# Patient Record
Sex: MALE | Race: BLACK OR AFRICAN AMERICAN | Employment: FULL TIME | URBAN - METROPOLITAN AREA
[De-identification: names, ages, dates, MRNs, and addresses within clinical notes are randomized per-mention and may not be internally consistent; named-entity substitution may affect disease eponyms.]

---

## 2020-03-16 ENCOUNTER — HOSPITAL ENCOUNTER (EMERGENCY)
Age: 35
Discharge: HOME | End: 2020-03-16
Attending: EMERGENCY MEDICINE
Payer: MEDICARE

## 2020-03-16 VITALS
HEART RATE: 71 BPM | DIASTOLIC BLOOD PRESSURE: 106 MMHG | OXYGEN SATURATION: 99 % | RESPIRATION RATE: 16 BRPM | WEIGHT: 150 LBS | BODY MASS INDEX: 21 KG/M2 | TEMPERATURE: 98.4 F | HEIGHT: 71 IN | SYSTOLIC BLOOD PRESSURE: 145 MMHG

## 2020-03-16 LAB
ALBUMIN SERPL-MCNC: 4.1 GM/DL (ref 3.4–5)
ALP BLD-CCNC: 83 IU/L (ref 40–129)
ALT SERPL-CCNC: 19 U/L (ref 10–40)
ANION GAP SERPL CALCULATED.3IONS-SCNC: 13 MMOL/L (ref 4–16)
AST SERPL-CCNC: 26 IU/L (ref 15–37)
BASOPHILS ABSOLUTE: 0.1 K/CU MM
BASOPHILS RELATIVE PERCENT: 0.6 % (ref 0–1)
BILIRUB SERPL-MCNC: 0.4 MG/DL (ref 0–1)
BUN BLDV-MCNC: 8 MG/DL (ref 6–23)
CALCIUM SERPL-MCNC: 9.4 MG/DL (ref 8.3–10.6)
CHLORIDE BLD-SCNC: 99 MMOL/L (ref 99–110)
CO2: 27 MMOL/L (ref 21–32)
CREAT SERPL-MCNC: 1.2 MG/DL (ref 0.9–1.3)
DIFFERENTIAL TYPE: ABNORMAL
EOSINOPHILS ABSOLUTE: 0.1 K/CU MM
EOSINOPHILS RELATIVE PERCENT: 0.7 % (ref 0–3)
GFR AFRICAN AMERICAN: >60 ML/MIN/1.73M2
GFR NON-AFRICAN AMERICAN: >60 ML/MIN/1.73M2
GLUCOSE BLD-MCNC: 126 MG/DL (ref 70–99)
HCT VFR BLD CALC: 51.1 % (ref 42–52)
HEMOGLOBIN: 16.8 GM/DL (ref 13.5–18)
IMMATURE NEUTROPHIL %: 0.6 % (ref 0–0.43)
LIPASE: 28 IU/L (ref 13–60)
LYMPHOCYTES ABSOLUTE: 2.5 K/CU MM
LYMPHOCYTES RELATIVE PERCENT: 28.2 % (ref 24–44)
MAGNESIUM: 2 MG/DL (ref 1.8–2.4)
MCH RBC QN AUTO: 33.1 PG (ref 27–31)
MCHC RBC AUTO-ENTMCNC: 32.9 % (ref 32–36)
MCV RBC AUTO: 100.8 FL (ref 78–100)
MONOCYTES ABSOLUTE: 1.2 K/CU MM
MONOCYTES RELATIVE PERCENT: 13 % (ref 0–4)
NUCLEATED RBC %: 0 %
PDW BLD-RTO: 12.4 % (ref 11.7–14.9)
PLATELET # BLD: 374 K/CU MM (ref 140–440)
PMV BLD AUTO: 9.2 FL (ref 7.5–11.1)
POTASSIUM SERPL-SCNC: 3.5 MMOL/L (ref 3.5–5.1)
RBC # BLD: 5.07 M/CU MM (ref 4.6–6.2)
SEGMENTED NEUTROPHILS ABSOLUTE COUNT: 5.1 K/CU MM
SEGMENTED NEUTROPHILS RELATIVE PERCENT: 56.9 % (ref 36–66)
SODIUM BLD-SCNC: 139 MMOL/L (ref 135–145)
TOTAL IMMATURE NEUTOROPHIL: 0.05 K/CU MM
TOTAL NUCLEATED RBC: 0 K/CU MM
TOTAL PROTEIN: 7.5 GM/DL (ref 6.4–8.2)
WBC # BLD: 8.9 K/CU MM (ref 4–10.5)

## 2020-03-16 PROCEDURE — 6360000002 HC RX W HCPCS: Performed by: EMERGENCY MEDICINE

## 2020-03-16 PROCEDURE — 6370000000 HC RX 637 (ALT 250 FOR IP): Performed by: EMERGENCY MEDICINE

## 2020-03-16 PROCEDURE — 85025 COMPLETE CBC W/AUTO DIFF WBC: CPT

## 2020-03-16 PROCEDURE — 83735 ASSAY OF MAGNESIUM: CPT

## 2020-03-16 PROCEDURE — 80053 COMPREHEN METABOLIC PANEL: CPT

## 2020-03-16 PROCEDURE — 36415 COLL VENOUS BLD VENIPUNCTURE: CPT

## 2020-03-16 PROCEDURE — 99284 EMERGENCY DEPT VISIT MOD MDM: CPT

## 2020-03-16 PROCEDURE — 96375 TX/PRO/DX INJ NEW DRUG ADDON: CPT

## 2020-03-16 PROCEDURE — 96374 THER/PROPH/DIAG INJ IV PUSH: CPT

## 2020-03-16 PROCEDURE — 2500000003 HC RX 250 WO HCPCS: Performed by: EMERGENCY MEDICINE

## 2020-03-16 PROCEDURE — 2580000003 HC RX 258: Performed by: EMERGENCY MEDICINE

## 2020-03-16 PROCEDURE — 83690 ASSAY OF LIPASE: CPT

## 2020-03-16 RX ORDER — 0.9 % SODIUM CHLORIDE 0.9 %
1000 INTRAVENOUS SOLUTION INTRAVENOUS ONCE
Status: COMPLETED | OUTPATIENT
Start: 2020-03-16 | End: 2020-03-16

## 2020-03-16 RX ORDER — ONDANSETRON 2 MG/ML
4 INJECTION INTRAMUSCULAR; INTRAVENOUS ONCE
Status: COMPLETED | OUTPATIENT
Start: 2020-03-16 | End: 2020-03-16

## 2020-03-16 RX ORDER — MAGNESIUM HYDROXIDE/ALUMINUM HYDROXICE/SIMETHICONE 120; 1200; 1200 MG/30ML; MG/30ML; MG/30ML
30 SUSPENSION ORAL ONCE
Status: COMPLETED | OUTPATIENT
Start: 2020-03-16 | End: 2020-03-16

## 2020-03-16 RX ORDER — FAMOTIDINE 20 MG/1
20 TABLET, FILM COATED ORAL 2 TIMES DAILY
Qty: 20 TABLET | Refills: 0 | Status: SHIPPED | OUTPATIENT
Start: 2020-03-16 | End: 2020-03-26

## 2020-03-16 RX ADMIN — SODIUM CHLORIDE 1000 ML: 9 INJECTION, SOLUTION INTRAVENOUS at 09:54

## 2020-03-16 RX ADMIN — ONDANSETRON 4 MG: 2 INJECTION INTRAMUSCULAR; INTRAVENOUS at 09:54

## 2020-03-16 RX ADMIN — ALUMINUM HYDROXIDE, MAGNESIUM HYDROXIDE, AND SIMETHICONE 30 ML: 200; 200; 20 SUSPENSION ORAL at 11:01

## 2020-03-16 RX ADMIN — FAMOTIDINE 20 MG: 10 INJECTION, SOLUTION INTRAVENOUS at 11:02

## 2020-03-16 ASSESSMENT — PAIN DESCRIPTION - LOCATION: LOCATION: ABDOMEN

## 2020-03-16 ASSESSMENT — PAIN DESCRIPTION - PAIN TYPE: TYPE: ACUTE PAIN

## 2020-03-16 ASSESSMENT — PAIN DESCRIPTION - ORIENTATION: ORIENTATION: MID;UPPER

## 2020-03-16 ASSESSMENT — PAIN DESCRIPTION - DESCRIPTORS: DESCRIPTORS: DISCOMFORT

## 2020-03-16 ASSESSMENT — PAIN SCALES - GENERAL: PAINLEVEL_OUTOF10: 0

## 2020-03-16 NOTE — ED PROVIDER NOTES
[address] [facility]  eMERGENCY dEPARTMENT eNCOUnter        Pt Name: Denise Rodarte  MRN: [id number]  [place] 1985  Date of evaluation: 3/16/2020  Provider: Gary Moeller DO  PCP: No primary care provider on file. CHIEF COMPLAINT       Chief Complaint   Patient presents with    Abdominal Pain     mid abd pain/ epigastric pain since friday evening    Emesis       HISTORY OFPRESENT ILLNESS   (Location/Symptom, Timing/Onset, Context/Setting, Quality, Duration, Modifying Factors,Severity)  Note limiting factors. Denise Rodarte is a 37 y.o. male does have a vast medical history presents to the ED with about 3 days of epigastric abdominal pain with intermittent episodes of vomiting. Patient also stated that he feels he is got some constipation as well. Patient states his symptoms are worse with smoking marijuana better with drinking water. Denies fever chills chest pain dyspnea no other pain or complaints. Nursing Notes were all reviewed and agreed with or any disagreements were addressed  in the HPI. REVIEW OF SYSTEMS    (2-9 systems for level 4, 10 or more for level 5)     Review of Systems    Positives and Pertinent negatives as per HPI. Except as noted above in the ROS, all other systems were reviewed andnegative. PASTMEDICAL HISTORY     Past Medical History:   Diagnosis Date    Headache     Mitral valve prolapse          SURGICAL HISTORY     History reviewed. No pertinent surgical history. CURRENT MEDICATIONS       Previous Medications    BUTALBITAL-ASPIRIN-CAFFEINE (FIORINAL) -40 MG CAPSULE    Take 1 capsule by mouth every 6 hours as needed for Headaches . LIDOCAINE (LIDODERM) 5 %    Place 1 patch onto the skin daily 12 hours on, 12 hours off. NAPROXEN (NAPROSYN) 500 MG TABLET    Take 1 tablet by mouth 2 times daily for 10 days       ALLERGIES     Patient has no known allergies.     FAMILY HISTORY Procedures    CRITICAL CARE TIME   N/A    CONSULTS:  None    EMERGENCY DEPARTMENT COURSE and DIFFERENTIAL DIAGNOSIS/MDM:   Vitals:    Vitals:    03/16/20 0833 03/16/20 0852 03/16/20 0932 03/16/20 1057   BP: (!) 157/116 (!) 139/108 (!) 140/104    Pulse: 101   71   Resp: 16      Temp: 98.4 °F (36.9 °C)      TempSrc: Oral      SpO2: 93% 96% 99%    Weight: 150 lb (68 kg)      Height: 5' 11\" (1.803 m)          Patient was given the following medications:  Medications   0.9 % sodium chloride bolus (0 mLs Intravenous Stopped 3/16/20 1058)   ondansetron (ZOFRAN) injection 4 mg (4 mg Intravenous Given 3/16/20 0954)   aluminum & magnesium hydroxide-simethicone (MAALOX) 200-200-20 MG/5ML suspension 30 mL (30 mLs Oral Given 3/16/20 1101)   famotidine (PEPCID) injection 20 mg (20 mg Intravenous Given 3/16/20 1102)       Patient presents the ED with intermittent abdominal pain, epigastric, for the several days. Patient some episodes of vomiting. Patient states symptoms are worse with smoking marijuana. In ED patient is nontoxic no acute distress abdomen soft nontender will get CBC BMP check for leukocytosis lectured abnormality of the lipase any biliary dysfunction will treat symptoms and reassess. Work-up unremarkable patient okay to DC PCP follow return to ED with any new or worsening symptoms    The patient tolerated their visit well. Thepatient and / or the family were informed of the results of any tests, a time was given to answer questions. FINAL IMPRESSION      1. Abdominal pain, epigastric    2.  Non-intractable vomiting with nausea, unspecified vomiting type        DISPOSITION/PLAN   DISPOSITION        PATIENT REFERRED TO:  Ariel Bingham DO  [address] [facility]  [place] [address] [place] [address]  [phone]    Call in 1 day        DISCHARGE MEDICATIONS:  New Prescriptions    FAMOTIDINE (PEPCID) 20 MG TABLET    Take 1 tablet by mouth 2 times daily for 10 days       DISCONTINUED MEDICATIONS:  Discontinued Medications    No medications on file              (Please note that portions of this note were completed with a voice recognition program.  Efforts were made to edit the dictations but occasionally words aremis-transcribed.)    Liana Reaves DO (electronically signed)            Liana Reaves DO  03/16/20 110

## 2020-04-12 ENCOUNTER — HOSPITAL ENCOUNTER (EMERGENCY)
Age: 35
Discharge: HOME | End: 2020-04-12
Attending: EMERGENCY MEDICINE
Payer: MEDICARE

## 2020-04-12 VITALS
RESPIRATION RATE: 16 BRPM | WEIGHT: 150 LBS | HEART RATE: 87 BPM | DIASTOLIC BLOOD PRESSURE: 94 MMHG | HEIGHT: 71 IN | SYSTOLIC BLOOD PRESSURE: 148 MMHG | OXYGEN SATURATION: 97 % | BODY MASS INDEX: 21 KG/M2 | TEMPERATURE: 98.4 F

## 2020-04-12 PROCEDURE — 99283 EMERGENCY DEPT VISIT LOW MDM: CPT

## 2020-04-12 RX ORDER — POLYMYXIN B SULFATE AND TRIMETHOPRIM 1; 10000 MG/ML; [USP'U]/ML
1 SOLUTION OPHTHALMIC EVERY 4 HOURS
Qty: 1 BOTTLE | Refills: 0 | Status: SHIPPED | OUTPATIENT
Start: 2020-04-12 | End: 2020-04-19

## 2020-04-12 ASSESSMENT — ENCOUNTER SYMPTOMS
WHEEZING: 0
SINUS PRESSURE: 0
ABDOMINAL PAIN: 0
NAUSEA: 0
VOMITING: 0
RHINORRHEA: 1
SORE THROAT: 0
CONSTIPATION: 0
SHORTNESS OF BREATH: 0
COUGH: 0
DIARRHEA: 0

## 2020-04-12 NOTE — ED PROVIDER NOTES
Financial resource strain: Not on file    Food insecurity     Worry: Not on file     Inability: Not on file   [employer] needs     Medical: Not on file     Non-medical: Not on file   Tobacco Use    Smoking status: Current Every Day Smoker     Packs/day: 0.50     Types: Cigarettes    Smokeless tobacco: Never Used   Substance and Sexual Activity    Alcohol use: Yes     Alcohol/week: 10.0 standard drinks     Types: 10 Shots of liquor per week     Comment: daily    Drug use: Yes     Types: Marijuana    Sexual activity: Yes     Partners: Female   Lifestyle    Physical activity     Days per week: Not on file     Minutes per session: Not on file    Stress: Not on file   Relationships    Social connections     Talks on phone: Not on file     Gets together: Not on file     Attends Congregation service: Not on file     Active member of club or organization: Not on file     Attends meetings of clubs or organizations: Not on file     Relationship status: Not on file    Intimate partner violence     Fear of current or ex partner: Not on file     Emotionally abused: Not on file     Physically abused: Not on file     Forced sexual activity: Not on file   Other Topics Concern    Not on file   Social History Narrative    Not on file     No current facility-administered medications for this encounter. Current Outpatient Medications   Medication Sig Dispense Refill    trimethoprim-polymyxin b (POLYTRIM) 01788-7.1 UNIT/ML-% ophthalmic solution Place 1 drop into both eyes every 4 hours for 7 days 1 Bottle 0    famotidine (PEPCID) 20 MG tablet Take 1 tablet by mouth 2 times daily for 10 days 20 tablet 0    butalbital-aspirin-caffeine (FIORINAL) -40 MG capsule Take 1 capsule by mouth every 6 hours as needed for Headaches .  20 capsule 0    naproxen (NAPROSYN) 500 MG tablet Take 1 tablet by mouth 2 times daily for 10 days 20 tablet 0    lidocaine (LIDODERM) 5 % Place 1 patch onto the skin daily 12 hours on, 12

## 2020-04-12 NOTE — ED TRIAGE NOTES
Pt to ED today for c/o bilateral eye redness and drainage, pt reports he wakes up in the mornings with his bilateral eyes matted shut.

## 2020-04-19 ENCOUNTER — HOSPITAL ENCOUNTER (EMERGENCY)
Age: 35
Discharge: HOME | End: 2020-04-19
Payer: MEDICARE

## 2020-04-19 VITALS
HEART RATE: 88 BPM | RESPIRATION RATE: 16 BRPM | DIASTOLIC BLOOD PRESSURE: 103 MMHG | WEIGHT: 150 LBS | TEMPERATURE: 98.6 F | BODY MASS INDEX: 21 KG/M2 | HEIGHT: 71 IN | SYSTOLIC BLOOD PRESSURE: 142 MMHG | OXYGEN SATURATION: 98 %

## 2020-04-19 PROCEDURE — 99282 EMERGENCY DEPT VISIT SF MDM: CPT

## 2020-04-19 PROCEDURE — 6370000000 HC RX 637 (ALT 250 FOR IP): Performed by: PHYSICIAN ASSISTANT

## 2020-04-19 RX ORDER — MOXIFLOXACIN 5 MG/ML
1 SOLUTION/ DROPS OPHTHALMIC 3 TIMES DAILY
Qty: 1 BOTTLE | Refills: 0 | Status: SHIPPED | OUTPATIENT
Start: 2020-04-19 | End: 2020-04-26

## 2020-04-19 RX ORDER — TETRACAINE HYDROCHLORIDE 5 MG/ML
2 SOLUTION OPHTHALMIC ONCE
Status: COMPLETED | OUTPATIENT
Start: 2020-04-19 | End: 2020-04-19

## 2020-04-19 RX ADMIN — TETRACAINE HYDROCHLORIDE 2 DROP: 5 SOLUTION OPHTHALMIC at 12:54

## 2020-04-19 RX ADMIN — FLUORESCEIN SODIUM 1 MG: 1 STRIP OPHTHALMIC at 12:54

## 2020-04-19 ASSESSMENT — VISUAL ACUITY
OU: 20/40
OS: 20/100
OD: 20/50

## 2020-04-19 ASSESSMENT — PAIN DESCRIPTION - PAIN TYPE: TYPE: ACUTE PAIN

## 2020-04-19 ASSESSMENT — PAIN DESCRIPTION - LOCATION: LOCATION: EYE

## 2020-04-19 ASSESSMENT — PAIN SCALES - GENERAL: PAINLEVEL_OUTOF10: 7

## 2020-04-19 NOTE — ED PROVIDER NOTES
Headache     Mitral valve prolapse      History reviewed. No pertinent surgical history. CURRENT MEDICATIONS    Current Outpatient Rx   Medication Sig Dispense Refill    moxifloxacin (VIGAMOX) 0.5 % ophthalmic solution Place 1 drop into both eyes 3 times daily for 7 days 1 Bottle 0    trimethoprim-polymyxin b (POLYTRIM) 67211-8.1 UNIT/ML-% ophthalmic solution Place 1 drop into both eyes every 4 hours for 7 days 1 Bottle 0    famotidine (PEPCID) 20 MG tablet Take 1 tablet by mouth 2 times daily for 10 days 20 tablet 0    butalbital-aspirin-caffeine (FIORINAL) -40 MG capsule Take 1 capsule by mouth every 6 hours as needed for Headaches . 20 capsule 0    naproxen (NAPROSYN) 500 MG tablet Take 1 tablet by mouth 2 times daily for 10 days 20 tablet 0    lidocaine (LIDODERM) 5 % Place 1 patch onto the skin daily 12 hours on, 12 hours off. 8 patch 0       ALLERGIES    No Known Allergies    SOCIAL and FAMILY HISTORY    Social History     Socioeconomic History    Marital status: Single     Spouse name: None    Number of children: None    Years of education: None    Highest education level: None   Occupational History    None   Social Needs    Financial resource strain: None    Food insecurity     Worry: None     Inability: None    Transportation needs     Medical: None     Non-medical: None   Tobacco Use    Smoking status: Current Every Day Smoker     Packs/day: 0.50     Types: Cigarettes    Smokeless tobacco: Never Used   Substance and Sexual Activity    Alcohol use:  Yes     Alcohol/week: 10.0 standard drinks     Types: 10 Shots of liquor per week     Comment: daily    Drug use: Yes     Types: Marijuana    Sexual activity: Yes     Partners: Female   Lifestyle    Physical activity     Days per week: None     Minutes per session: None    Stress: None   Relationships    Social connections     Talks on phone: None     Gets together: None     Attends Bahai service: None     Active member of fluorescein uptake bilaterally concerning for corneal abrasion or ulceration. Following tetracaine eyedrops, patient has had significant improvement of his pain. At this time, I do suspect likely conjunctivitis. Upon further discussion with patient, does not appear that he was taking the initially prescribed Polytrim eyedrops as prescribed. We will switch him to moxifloxacin eyedrops and educated on close follow-up with ophthalmology in the next 24 hours for recheck. .  Low clinical vision at this time for herpes keratitis, iritis, corneal ulceration, globe rupture, acute angle glaucoma, periorbital/orbital cellulitis or abscess. Patient is discharged at this time stable condition with the moxifloxacin eyedrops. Encourage warm compresses to the eyelids in the morning with gentle eyelid margin washing. Given outpatient contact formation for on-call ophthalmology. Patient does not have PCP so I have given him/her doctor of the day contact information and encourage him/her to establish care and followup in the next 1-2 days. Return warnings back to the ED discussed. In light of current events, I did utilize appropriate PPE (including surgical face mask, safety glasses, gloves, as recommended by the health facility/national standard best practice, during my bedside interactions with the patient. Discharge with medication and followup with ophthalmologist (see EMR)  Diagnosis and plan discussed in detail with patient who understands and agrees. Patient agrees to return emergency department if symptoms worsen or any new symptoms develop. Comment: Please note this report has been produced using speech recognition software and may contain errors related to that system including errors in grammar, punctuation, and spelling, as well as words and phrases that may be inappropriate. If there are any questions or concerns please feel free to contact the dictating provider for clarification.           Ander Ahmadi AKIRA Cardozo  04/19/20 2424

## 2023-09-27 ENCOUNTER — APPOINTMENT (OUTPATIENT)
Dept: GENERAL RADIOLOGY | Age: 38
End: 2023-09-27
Payer: MEDICAID

## 2023-09-27 ENCOUNTER — HOSPITAL ENCOUNTER (EMERGENCY)
Age: 38
Discharge: LEFT BEFORE DISCHARGE | End: 2023-09-27
Payer: MEDICAID

## 2023-09-27 ENCOUNTER — APPOINTMENT (OUTPATIENT)
Dept: ULTRASOUND IMAGING | Age: 38
End: 2023-09-27
Payer: MEDICAID

## 2023-09-27 VITALS
BODY MASS INDEX: 20.58 KG/M2 | RESPIRATION RATE: 16 BRPM | SYSTOLIC BLOOD PRESSURE: 147 MMHG | DIASTOLIC BLOOD PRESSURE: 99 MMHG | OXYGEN SATURATION: 95 % | HEART RATE: 107 BPM | HEIGHT: 71 IN | TEMPERATURE: 98.5 F | WEIGHT: 147 LBS

## 2023-09-27 DIAGNOSIS — R00.0 TACHYCARDIA: ICD-10-CM

## 2023-09-27 DIAGNOSIS — M79.671 RIGHT FOOT PAIN: Primary | ICD-10-CM

## 2023-09-27 DIAGNOSIS — S01.81XA FACIAL LACERATION, INITIAL ENCOUNTER: ICD-10-CM

## 2023-09-27 LAB
EKG ATRIAL RATE: 111 BPM
EKG DIAGNOSIS: NORMAL
EKG P AXIS: 80 DEGREES
EKG P-R INTERVAL: 142 MS
EKG Q-T INTERVAL: 348 MS
EKG QRS DURATION: 80 MS
EKG QTC CALCULATION (BAZETT): 473 MS
EKG R AXIS: 28 DEGREES
EKG T AXIS: 66 DEGREES
EKG VENTRICULAR RATE: 111 BPM

## 2023-09-27 PROCEDURE — 73630 X-RAY EXAM OF FOOT: CPT

## 2023-09-27 PROCEDURE — 93010 ELECTROCARDIOGRAM REPORT: CPT | Performed by: INTERNAL MEDICINE

## 2023-09-27 PROCEDURE — 80048 BASIC METABOLIC PNL TOTAL CA: CPT

## 2023-09-27 PROCEDURE — 73610 X-RAY EXAM OF ANKLE: CPT

## 2023-09-27 PROCEDURE — 99284 EMERGENCY DEPT VISIT MOD MDM: CPT

## 2023-09-27 PROCEDURE — 6370000000 HC RX 637 (ALT 250 FOR IP): Performed by: NURSE PRACTITIONER

## 2023-09-27 PROCEDURE — 93005 ELECTROCARDIOGRAM TRACING: CPT | Performed by: NURSE PRACTITIONER

## 2023-09-27 PROCEDURE — 93971 EXTREMITY STUDY: CPT

## 2023-09-27 RX ORDER — IBUPROFEN 600 MG/1
600 TABLET ORAL 3 TIMES DAILY PRN
Qty: 30 TABLET | Refills: 0 | Status: SHIPPED | OUTPATIENT
Start: 2023-09-27

## 2023-09-27 RX ORDER — OXYCODONE HYDROCHLORIDE AND ACETAMINOPHEN 5; 325 MG/1; MG/1
1 TABLET ORAL ONCE
Status: COMPLETED | OUTPATIENT
Start: 2023-09-27 | End: 2023-09-27

## 2023-09-27 RX ORDER — 0.9 % SODIUM CHLORIDE 0.9 %
1000 INTRAVENOUS SOLUTION INTRAVENOUS ONCE
Status: DISCONTINUED | OUTPATIENT
Start: 2023-09-27 | End: 2023-09-27 | Stop reason: HOSPADM

## 2023-09-27 RX ADMIN — OXYCODONE AND ACETAMINOPHEN 1 TABLET: 5; 325 TABLET ORAL at 12:09

## 2023-09-27 ASSESSMENT — PAIN SCALES - GENERAL: PAINLEVEL_OUTOF10: 10

## 2023-09-27 NOTE — DISCHARGE INSTRUCTIONS
Keep wound clean and dry, wash with mild soap and water daily. Apply thin layer of antibiotic ointment twice daily. Wear Ace bandage and postop shoe for comfort and support. Elevate extremity. Ice area several times daily for at least 20 minutes. Use crutches to weight-bear as tolerated. If having continued symptoms in a week, follow-up with orthopedics. Call to schedule an appointment. Follow-up with a primary care provider this week. Have them recheck your blood pressure and your pulse it was elevated during today's stay. Return with worsening symptoms.

## 2023-09-27 NOTE — ED NOTES
Patient refusing Percocet and IV at this time. Franky Gitelman, NP notified.       Fadi Hartmann RN  09/27/23 1120 Ortho POC PA/Event Note

## 2023-09-27 NOTE — ED PROVIDER NOTES
EMERGENCY DEPARTMENT ENCOUNTER      PCP: No primary care provider on file. CHIEF COMPLAINT    Chief Complaint   Patient presents with    Foot Pain     Pt reports he dropped his night stand on top of his right foot. Reports swelling and bruising         This patient was not evaluated by the attending physician. I have independently evaluated this patient . HPI    Ольгаporter Soriano is a 42 y.o. male who presents with complaints of right foot and calf pain. The patient states he dropped a nightstand on the top of his foot right foot last night. He states he developed immediate pain. He also has a small laceration above his right eyebrow. He states he accidentally hit his head with the corner of the nightstand. States his tetanus is up-to-date. He is complaining of pain in his foot he rates as moderate, aching and throbbing, and constant. He states he is also having pain in his right calf. Nothing has alleviated his symptoms. Range of motion, palpation, and ambulation exacerbate his symptoms. He denies loss of consciousness or other injury. He is tachycardic with a heart rate in the 130s. He does state he drank some alcohol yesterday with his last drink being at 730. He denies fevers, stimulant use, chest pain, shortness of breath,  or other complaints. No distal numbness, tingling, weakness, or functional deficit. No other pain. REVIEW OF SYSTEMS    General: Denies constitutional symptoms. Cardiac: Denies chest wall injury or chest pain  Pulmonary: Denies chest wall injury or shortness of breath  GI: Denies abdomen injury or abdominal pain  Musculoskeletal:  Denies any other musculoskeletal pain or injuries, including chest wall and back. Skin: 1 cm laceration above right eyebrow. Lymphatics:  No nodules or streaks.       See HPI and nursing notes for additional information     PAST MEDICAL & SURGICAL HISTORY    Past Medical History:   Diagnosis Date    Headache     Mitral valve for clarification.       Juanita Garcia, APRN - CNP  09/29/23 4621

## 2023-09-28 ENCOUNTER — TELEPHONE (OUTPATIENT)
Dept: FAMILY MEDICINE CLINIC | Age: 38
End: 2023-09-28

## 2023-09-28 NOTE — TELEPHONE ENCOUNTER
----- Message from Anshul Godfrey PA-C sent at 9/28/2023  6:33 AM EDT -----  Regarding: Doc of the Day    ----- Message -----  From: Automatic Discharge Provider  Sent: 9/27/2023  12:46 PM EDT  To: Anshul Godfrey PA-C

## 2023-10-05 ENCOUNTER — OFFICE VISIT (OUTPATIENT)
Dept: FAMILY MEDICINE CLINIC | Age: 38
End: 2023-10-05
Payer: MEDICAID

## 2023-10-05 VITALS
SYSTOLIC BLOOD PRESSURE: 138 MMHG | WEIGHT: 139 LBS | BODY MASS INDEX: 19.46 KG/M2 | HEIGHT: 71 IN | OXYGEN SATURATION: 99 % | DIASTOLIC BLOOD PRESSURE: 98 MMHG | HEART RATE: 101 BPM

## 2023-10-05 DIAGNOSIS — R03.0 ELEVATED BP WITHOUT DIAGNOSIS OF HYPERTENSION: ICD-10-CM

## 2023-10-05 DIAGNOSIS — G44.001 INTRACTABLE CLUSTER HEADACHE SYNDROME, UNSPECIFIED CHRONICITY PATTERN: ICD-10-CM

## 2023-10-05 DIAGNOSIS — M79.671 RIGHT FOOT PAIN: Primary | ICD-10-CM

## 2023-10-05 PROCEDURE — 99203 OFFICE O/P NEW LOW 30 MIN: CPT | Performed by: STUDENT IN AN ORGANIZED HEALTH CARE EDUCATION/TRAINING PROGRAM

## 2023-10-05 RX ORDER — IBUPROFEN 800 MG/1
800 TABLET ORAL 4 TIMES DAILY PRN
Qty: 120 TABLET | Refills: 0 | Status: SHIPPED | OUTPATIENT
Start: 2023-10-05

## 2023-10-05 RX ORDER — BUTALBITAL, ACETAMINOPHEN AND CAFFEINE 50; 325; 40 MG/1; MG/1; MG/1
1 TABLET ORAL EVERY 4 HOURS PRN
Qty: 180 TABLET | Refills: 3 | Status: SHIPPED | OUTPATIENT
Start: 2023-10-05

## 2023-10-05 ASSESSMENT — ENCOUNTER SYMPTOMS
RHINORRHEA: 0
DIARRHEA: 0
NAUSEA: 0
VOMITING: 0
CONSTIPATION: 0
COUGH: 0
WHEEZING: 0
SHORTNESS OF BREATH: 0
SORE THROAT: 0

## 2023-10-05 ASSESSMENT — PATIENT HEALTH QUESTIONNAIRE - PHQ9
SUM OF ALL RESPONSES TO PHQ QUESTIONS 1-9: 0
SUM OF ALL RESPONSES TO PHQ QUESTIONS 1-9: 0
1. LITTLE INTEREST OR PLEASURE IN DOING THINGS: 0
SUM OF ALL RESPONSES TO PHQ QUESTIONS 1-9: 0
2. FEELING DOWN, DEPRESSED OR HOPELESS: 0
SUM OF ALL RESPONSES TO PHQ9 QUESTIONS 1 & 2: 0
SUM OF ALL RESPONSES TO PHQ QUESTIONS 1-9: 0

## 2023-10-05 NOTE — ASSESSMENT & PLAN NOTE
-BP likely elevated 2/2 pain  -recheck BP at next visit after foot better healed and pain controlled.

## 2023-10-05 NOTE — PROGRESS NOTES
Subjective     Patient ID: Charlee Angela is a 42 y.o. male who presents for ED Follow-up (New patient appointment 11/8/23), Foot Pain (Right Slammed foot in door. Screen door and frame. 9/26 happened, ED next day. Still having trouble walking. Pain 8. Ibuprofen not helping. Calf on same side has been sore), and Headache (Cluster headaches. Took fiorocet and worked well. Will happen daily for couple weeks then gone for few weeks to months. ). Recently went to ED on 9/27/23 after slamming glass sliding door on foot. Foot very painful and swollen at time of ED visit. Xrays and doppler US were all normal and negative for any type of fracture. Since then, he has been taking ibuprofen 600mg and Tyelnol 1000mg for pain. Also keeping it elevated and icing it throughout the day. Has not been set up with physical therapy yet. Also complains of reoccurring headaches. Says that they come at the same time every day. Start off in back of head, and then travel to stabbing sensation in hi left eye. Denies vision changes, n/v, or weakness/numbness. Says that he has been given fiorcet for headaches in past, and its the only thing that has given him relief. Review of Systems   Constitutional:  Negative for activity change, appetite change and fever. HENT:  Negative for congestion, rhinorrhea and sore throat. Respiratory:  Negative for cough, shortness of breath and wheezing. Gastrointestinal:  Negative for constipation, diarrhea, nausea and vomiting. Musculoskeletal:  Positive for arthralgias and joint swelling. Negative for gait problem. Right foot pain and swelling   Skin:  Negative for rash. Neurological:  Positive for headaches. Negative for dizziness, syncope, facial asymmetry, weakness, light-headedness and numbness. All other systems reviewed and are negative.        Objective     Vitals:    10/05/23 0807   BP: (!) 138/98   Pulse: (!) 101   SpO2: 99%       Physical Exam  Vitals and

## 2023-10-05 NOTE — ASSESSMENT & PLAN NOTE
-Rx for fiorecet sent to pharmacy  -may consider trying sumatriptan in future  -no alarm signs present.

## 2023-11-08 PROBLEM — I10 PRIMARY HYPERTENSION: Status: ACTIVE | Noted: 2023-11-08

## 2024-01-10 ENCOUNTER — OFFICE VISIT (OUTPATIENT)
Dept: FAMILY MEDICINE CLINIC | Age: 39
End: 2024-01-10
Payer: MEDICAID

## 2024-01-10 VITALS
RESPIRATION RATE: 16 BRPM | SYSTOLIC BLOOD PRESSURE: 134 MMHG | DIASTOLIC BLOOD PRESSURE: 90 MMHG | OXYGEN SATURATION: 99 % | HEART RATE: 98 BPM | HEIGHT: 71 IN | BODY MASS INDEX: 19.71 KG/M2 | WEIGHT: 140.8 LBS

## 2024-01-10 DIAGNOSIS — B35.3 TINEA PEDIS OF BOTH FEET: ICD-10-CM

## 2024-01-10 DIAGNOSIS — Z13.31 NEGATIVE DEPRESSION SCREENING: ICD-10-CM

## 2024-01-10 DIAGNOSIS — G44.001 INTRACTABLE CLUSTER HEADACHE SYNDROME, UNSPECIFIED CHRONICITY PATTERN: ICD-10-CM

## 2024-01-10 DIAGNOSIS — R03.0 ELEVATED BP WITHOUT DIAGNOSIS OF HYPERTENSION: ICD-10-CM

## 2024-01-10 DIAGNOSIS — K04.7 DENTAL ABSCESS: ICD-10-CM

## 2024-01-10 DIAGNOSIS — Z13.220 SCREENING FOR CHOLESTEROL LEVEL: ICD-10-CM

## 2024-01-10 DIAGNOSIS — F17.210 TOBACCO DEPENDENCE DUE TO CIGARETTES: ICD-10-CM

## 2024-01-10 DIAGNOSIS — Z76.89 ENCOUNTER TO ESTABLISH CARE WITH NEW DOCTOR: Primary | ICD-10-CM

## 2024-01-10 PROCEDURE — 99214 OFFICE O/P EST MOD 30 MIN: CPT | Performed by: STUDENT IN AN ORGANIZED HEALTH CARE EDUCATION/TRAINING PROGRAM

## 2024-01-10 PROCEDURE — 3075F SYST BP GE 130 - 139MM HG: CPT | Performed by: STUDENT IN AN ORGANIZED HEALTH CARE EDUCATION/TRAINING PROGRAM

## 2024-01-10 PROCEDURE — 3080F DIAST BP >= 90 MM HG: CPT | Performed by: STUDENT IN AN ORGANIZED HEALTH CARE EDUCATION/TRAINING PROGRAM

## 2024-01-10 PROCEDURE — 96127 BRIEF EMOTIONAL/BEHAV ASSMT: CPT | Performed by: STUDENT IN AN ORGANIZED HEALTH CARE EDUCATION/TRAINING PROGRAM

## 2024-01-10 PROCEDURE — 99406 BEHAV CHNG SMOKING 3-10 MIN: CPT | Performed by: STUDENT IN AN ORGANIZED HEALTH CARE EDUCATION/TRAINING PROGRAM

## 2024-01-10 RX ORDER — NICOTINE 21 MG/24HR
1 PATCH, TRANSDERMAL 24 HOURS TRANSDERMAL DAILY
Qty: 42 PATCH | Refills: 0 | Status: SHIPPED | OUTPATIENT
Start: 2024-01-10 | End: 2024-02-21

## 2024-01-10 RX ORDER — BUTALBITAL, ACETAMINOPHEN AND CAFFEINE 50; 325; 40 MG/1; MG/1; MG/1
1 TABLET ORAL EVERY 4 HOURS PRN
Qty: 180 TABLET | Refills: 3 | Status: SHIPPED | OUTPATIENT
Start: 2024-01-10

## 2024-01-10 RX ORDER — AMOXICILLIN 500 MG/1
500 CAPSULE ORAL 2 TIMES DAILY
Qty: 14 CAPSULE | Refills: 0 | Status: SHIPPED | OUTPATIENT
Start: 2024-01-10 | End: 2024-01-17

## 2024-01-10 RX ORDER — CLOTRIMAZOLE AND BETAMETHASONE DIPROPIONATE 10; .64 MG/G; MG/G
CREAM TOPICAL
Qty: 15 G | Refills: 1 | Status: SHIPPED | OUTPATIENT
Start: 2024-01-10

## 2024-01-10 RX ORDER — NICOTINE 21 MG/24HR
1 PATCH, TRANSDERMAL 24 HOURS TRANSDERMAL DAILY
Qty: 42 PATCH | Refills: 0 | Status: SHIPPED | OUTPATIENT
Start: 2024-02-21 | End: 2024-04-03

## 2024-01-10 ASSESSMENT — ENCOUNTER SYMPTOMS
SHORTNESS OF BREATH: 0
SORE THROAT: 0
COUGH: 0
DIARRHEA: 0
RHINORRHEA: 0
CONSTIPATION: 0
WHEEZING: 0

## 2024-01-10 ASSESSMENT — PATIENT HEALTH QUESTIONNAIRE - PHQ9
2. FEELING DOWN, DEPRESSED OR HOPELESS: 0
SUM OF ALL RESPONSES TO PHQ QUESTIONS 1-9: 0
SUM OF ALL RESPONSES TO PHQ QUESTIONS 1-9: 0
SUM OF ALL RESPONSES TO PHQ9 QUESTIONS 1 & 2: 0
1. LITTLE INTEREST OR PLEASURE IN DOING THINGS: 0
SUM OF ALL RESPONSES TO PHQ QUESTIONS 1-9: 0
SUM OF ALL RESPONSES TO PHQ QUESTIONS 1-9: 0

## 2024-01-10 NOTE — ASSESSMENT & PLAN NOTE
Counseled on and recommended tobacco cessation for approximately 5 min. Discussed different medications to help quit smoking. Patient  desires to quit, and interested in trying nicotine patches.

## 2024-01-10 NOTE — ASSESSMENT & PLAN NOTE
-improved from last time, but slightly elevate still.  Likely since having toothache  -if still elevated at next visit, will consider starting lisinopril 10  -ordered CBC and CMP

## 2024-01-10 NOTE — PROGRESS NOTES
Subjective     Patient ID: Maria Luz is a 38 y.o. male who presents for New Patient (Establish Care - having a problem with left back tooth, needs to find a dentist. /Athletes foot for about 4 months - has tried OTC things but nothing has worked. ).     Presents to establish care.  Seen a couple of months ago in clinic as ED follow up for foot injury.  Foot doing much better now. BP elevated at that time, but seems to have been elevated 2/2 pain as expected.  BP much improved today. Slightly elevated, but says he has a toothache    Today reports having a toothache for approx 4-5 days.  Plans on getting into dentist soon.     Athletes foot -- has gone on for several months.  Is bilateral with itchiness between toes.  Has tried OTC creams 1x daily, but only giving little relief    Tobacco use -- smokes 1ppd. Wants to quit and interested in patches    Migraines -- gets approximately 1x per week at 11am.  Gets it starting on left eye and travels back behind left ear. Gets sensitivity to light and sound. Denies n/v, vision changes, hearing changes, or syncope.  Fiorcet seems to control well.          Review of Systems   Constitutional:  Negative for activity change, appetite change and fever.   HENT:  Positive for dental problem. Negative for congestion, rhinorrhea and sore throat.    Eyes:  Negative for visual disturbance.   Respiratory:  Negative for cough, shortness of breath and wheezing.    Cardiovascular:  Negative for chest pain, palpitations and leg swelling.   Gastrointestinal:  Negative for constipation and diarrhea.   Skin:  Positive for rash (pruritic rash inbetween toes biilaterally).   Neurological:  Positive for headaches. Negative for dizziness, tremors, seizures, weakness, light-headedness and numbness.   All other systems reviewed and are negative.       Objective   Vitals:    01/10/24 1407   BP: (!) 134/90   Pulse: 98   Resp: 16   SpO2: 99%       Physical Exam  Vitals and nursing note reviewed.

## 2024-03-18 ENCOUNTER — HOSPITAL ENCOUNTER (EMERGENCY)
Age: 39
Discharge: HOME | End: 2024-03-18
Payer: MEDICAID

## 2024-03-18 VITALS
HEART RATE: 96 BPM | BODY MASS INDEX: 20.64 KG/M2 | WEIGHT: 148 LBS | OXYGEN SATURATION: 100 % | TEMPERATURE: 98 F | DIASTOLIC BLOOD PRESSURE: 106 MMHG | RESPIRATION RATE: 19 BRPM | SYSTOLIC BLOOD PRESSURE: 143 MMHG

## 2024-03-18 DIAGNOSIS — K08.89 PAIN, DENTAL: Primary | ICD-10-CM

## 2024-03-18 PROCEDURE — 99283 EMERGENCY DEPT VISIT LOW MDM: CPT

## 2024-03-18 PROCEDURE — 6370000000 HC RX 637 (ALT 250 FOR IP): Performed by: PHYSICIAN ASSISTANT

## 2024-03-18 RX ORDER — AMOXICILLIN 250 MG/1
500 CAPSULE ORAL ONCE
Status: COMPLETED | OUTPATIENT
Start: 2024-03-18 | End: 2024-03-18

## 2024-03-18 RX ORDER — HYDROCODONE BITARTRATE AND ACETAMINOPHEN 5; 325 MG/1; MG/1
1 TABLET ORAL ONCE
Status: COMPLETED | OUTPATIENT
Start: 2024-03-18 | End: 2024-03-18

## 2024-03-18 RX ORDER — AMOXICILLIN 500 MG/1
500 CAPSULE ORAL 2 TIMES DAILY
Qty: 14 CAPSULE | Refills: 0 | Status: SHIPPED | OUTPATIENT
Start: 2024-03-18 | End: 2024-03-25

## 2024-03-18 RX ORDER — MELOXICAM 7.5 MG/1
7.5 TABLET ORAL 2 TIMES DAILY
Qty: 20 TABLET | Refills: 0 | Status: SHIPPED | OUTPATIENT
Start: 2024-03-18 | End: 2024-03-28

## 2024-03-18 RX ORDER — IBUPROFEN 600 MG/1
600 TABLET ORAL ONCE
Status: COMPLETED | OUTPATIENT
Start: 2024-03-18 | End: 2024-03-18

## 2024-03-18 RX ADMIN — HYDROCODONE BITARTRATE AND ACETAMINOPHEN 1 TABLET: 5; 325 TABLET ORAL at 08:18

## 2024-03-18 RX ADMIN — IBUPROFEN 600 MG: 600 TABLET, FILM COATED ORAL at 08:18

## 2024-03-18 RX ADMIN — AMOXICILLIN 500 MG: 250 CAPSULE ORAL at 08:18

## 2024-03-18 ASSESSMENT — PAIN SCALES - GENERAL: PAINLEVEL_OUTOF10: 10

## 2024-03-18 ASSESSMENT — PAIN DESCRIPTION - LOCATION: LOCATION: JAW;TEETH

## 2024-03-18 ASSESSMENT — PAIN DESCRIPTION - ORIENTATION: ORIENTATION: LEFT;LOWER;UPPER

## 2024-03-18 ASSESSMENT — PAIN DESCRIPTION - DESCRIPTORS: DESCRIPTORS: THROBBING

## 2024-03-18 ASSESSMENT — PAIN DESCRIPTION - PAIN TYPE: TYPE: ACUTE PAIN

## 2024-03-18 ASSESSMENT — PAIN DESCRIPTION - FREQUENCY: FREQUENCY: CONTINUOUS

## 2024-03-18 NOTE — ED PROVIDER NOTES
medications:  Medications   ibuprofen (ADVIL;MOTRIN) tablet 600 mg (600 mg Oral Given 3/18/24 0818)   amoxicillin (AMOXIL) capsule 500 mg (500 mg Oral Given 3/18/24 0818)   HYDROcodone-acetaminophen (NORCO) 5-325 MG per tablet 1 tablet (1 tablet Oral Given 3/18/24 0818)     Discussion with Other Profesionals : None    Social Determinants : None    Records Reviewed : None    Appropriate for outpatient management      I am the Primary Clinician of Record.        CLINICAL IMPRESSION      1. Pain, dental          DISPOSITION/PLAN   DISPOSITION Decision To Discharge 03/18/2024 08:09:20 AM      PATIENT REFERREDTO:  Follow up with dentist as we discussed during the encounter            DISCHARGE MEDICATIONS:  Discharge Medication List as of 3/18/2024  8:15 AM        START taking these medications    Details   meloxicam (MOBIC) 7.5 MG tablet Take 1 tablet by mouth in the morning and at bedtime for 10 days, Disp-20 tablet, R-0Normal      amoxicillin (AMOXIL) 500 MG capsule Take 1 capsule by mouth 2 times daily for 7 days, Disp-14 capsule, R-0Normal      Magic Mouthwash (MIRACLE MOUTHWASH) Swish and spit 5 mLs 4 times daily as needed for Irritation Benadryl, nystatin and lidocaine equal parts, Disp-240 mL, R-0Normal             DISCONTINUED MEDICATIONS:  Discharge Medication List as of 3/18/2024  8:15 AM                 (Please note that portions ofthis note were completed with a voice recognition program.  Efforts were made to edit the dictations but occasionally words are mis-transcribed.)    ARNOLD MELENDEZ (electronically signed)             Krishan Chavez PA  03/18/24 0832

## 2024-06-14 ENCOUNTER — HOSPITAL ENCOUNTER (EMERGENCY)
Age: 39
Discharge: HOME | End: 2024-06-14
Payer: MEDICAID

## 2024-06-14 VITALS
BODY MASS INDEX: 20.58 KG/M2 | RESPIRATION RATE: 18 BRPM | HEIGHT: 71 IN | HEART RATE: 100 BPM | TEMPERATURE: 98.3 F | WEIGHT: 147 LBS | SYSTOLIC BLOOD PRESSURE: 160 MMHG | OXYGEN SATURATION: 100 % | DIASTOLIC BLOOD PRESSURE: 124 MMHG

## 2024-06-14 DIAGNOSIS — K04.7 PERIAPICAL ABSCESS WITHOUT SINUS TRACT: Primary | ICD-10-CM

## 2024-06-14 DIAGNOSIS — I10 ESSENTIAL HYPERTENSION: ICD-10-CM

## 2024-06-14 PROCEDURE — 6370000000 HC RX 637 (ALT 250 FOR IP): Performed by: PHYSICIAN ASSISTANT

## 2024-06-14 PROCEDURE — 99283 EMERGENCY DEPT VISIT LOW MDM: CPT

## 2024-06-14 PROCEDURE — 6360000002 HC RX W HCPCS: Performed by: PHYSICIAN ASSISTANT

## 2024-06-14 PROCEDURE — 2500000003 HC RX 250 WO HCPCS: Performed by: PHYSICIAN ASSISTANT

## 2024-06-14 RX ORDER — ACETAMINOPHEN 325 MG/1
650 TABLET ORAL EVERY 6 HOURS PRN
Qty: 120 TABLET | Refills: 0 | Status: SHIPPED | OUTPATIENT
Start: 2024-06-14

## 2024-06-14 RX ORDER — BUPIVACAINE HYDROCHLORIDE 2.5 MG/ML
30 INJECTION, SOLUTION EPIDURAL; INFILTRATION; INTRACAUDAL ONCE
Status: COMPLETED | OUTPATIENT
Start: 2024-06-14 | End: 2024-06-14

## 2024-06-14 RX ORDER — OXYCODONE HYDROCHLORIDE AND ACETAMINOPHEN 5; 325 MG/1; MG/1
1 TABLET ORAL ONCE
Status: COMPLETED | OUTPATIENT
Start: 2024-06-14 | End: 2024-06-14

## 2024-06-14 RX ORDER — AMOXICILLIN AND CLAVULANATE POTASSIUM 875; 125 MG/1; MG/1
1 TABLET, FILM COATED ORAL 2 TIMES DAILY
Qty: 20 TABLET | Refills: 0 | Status: SHIPPED | OUTPATIENT
Start: 2024-06-14 | End: 2024-06-24

## 2024-06-14 RX ORDER — LIDOCAINE HYDROCHLORIDE 10 MG/ML
5 INJECTION, SOLUTION INFILTRATION; PERINEURAL ONCE
Status: COMPLETED | OUTPATIENT
Start: 2024-06-14 | End: 2024-06-14

## 2024-06-14 RX ORDER — CHLORHEXIDINE GLUCONATE ORAL RINSE 1.2 MG/ML
15 SOLUTION DENTAL 2 TIMES DAILY
Qty: 420 ML | Refills: 0 | Status: SHIPPED | OUTPATIENT
Start: 2024-06-14 | End: 2024-06-28

## 2024-06-14 RX ORDER — MELOXICAM 15 MG/1
15 TABLET ORAL DAILY
Qty: 30 TABLET | Refills: 0 | Status: SHIPPED | OUTPATIENT
Start: 2024-06-14

## 2024-06-14 RX ADMIN — OXYCODONE AND ACETAMINOPHEN 1 TABLET: 5; 325 TABLET ORAL at 10:04

## 2024-06-14 RX ADMIN — LIDOCAINE HYDROCHLORIDE 5 ML: 10 INJECTION, SOLUTION INFILTRATION; PERINEURAL at 10:05

## 2024-06-14 RX ADMIN — BUPIVACAINE HYDROCHLORIDE 75 MG: 2.5 INJECTION, SOLUTION EPIDURAL; INFILTRATION; INTRACAUDAL; PERINEURAL at 10:05

## 2024-06-14 ASSESSMENT — LIFESTYLE VARIABLES
HOW MANY STANDARD DRINKS CONTAINING ALCOHOL DO YOU HAVE ON A TYPICAL DAY: 1 OR 2
HOW OFTEN DO YOU HAVE A DRINK CONTAINING ALCOHOL: MONTHLY OR LESS

## 2024-06-14 ASSESSMENT — PAIN DESCRIPTION - LOCATION: LOCATION: TEETH

## 2024-06-14 ASSESSMENT — PAIN - FUNCTIONAL ASSESSMENT
PAIN_FUNCTIONAL_ASSESSMENT: 0-10
PAIN_FUNCTIONAL_ASSESSMENT: ACTIVITIES ARE NOT PREVENTED

## 2024-06-14 ASSESSMENT — PAIN DESCRIPTION - DESCRIPTORS: DESCRIPTORS: THROBBING

## 2024-06-14 ASSESSMENT — PAIN SCALES - GENERAL: PAINLEVEL_OUTOF10: 9

## 2024-06-14 NOTE — DISCHARGE INSTRUCTIONS
Rinse and spit with the Peridex supplied twice a day.  Take the full course antibiotics, stop taking Motrin take the Mobic instead take Tylenol as well.  Warm compresses can help.  You at high blood pressure today recommend seeing your primary care provider for recheck in 1 week.  Your pain could possibly be bsecondary to your dental pain.

## 2024-06-14 NOTE — ED PROVIDER NOTES
spelling, as well as words and phrases that may be inappropriate.  Efforts were made to edit the dictations.        Randal Cazares PA-C  06/14/24 105

## 2024-06-14 NOTE — ED TRIAGE NOTES
Patient to [facility] with c/o dental pain that started yesterday. Patient states his lower right and left back teeth are throbbing. States it woke him up out of his sleep. Denies fever. Resps even and unlabored.

## 2024-11-05 ENCOUNTER — APPOINTMENT (OUTPATIENT)
Dept: CT IMAGING | Age: 39
End: 2024-11-05
Payer: MEDICAID

## 2024-11-05 ENCOUNTER — HOSPITAL ENCOUNTER (EMERGENCY)
Age: 39
Discharge: HOME | End: 2024-11-05
Payer: MEDICAID

## 2024-11-05 VITALS
SYSTOLIC BLOOD PRESSURE: 137 MMHG | WEIGHT: 144 LBS | BODY MASS INDEX: 20.08 KG/M2 | OXYGEN SATURATION: 98 % | RESPIRATION RATE: 16 BRPM | TEMPERATURE: 98.3 F | DIASTOLIC BLOOD PRESSURE: 97 MMHG | HEART RATE: 91 BPM

## 2024-11-05 DIAGNOSIS — S09.90XA INJURY OF HEAD, INITIAL ENCOUNTER: Primary | ICD-10-CM

## 2024-11-05 PROCEDURE — 99284 EMERGENCY DEPT VISIT MOD MDM: CPT

## 2024-11-05 PROCEDURE — 70450 CT HEAD/BRAIN W/O DYE: CPT

## 2024-11-05 RX ORDER — IBUPROFEN 600 MG/1
600 TABLET, FILM COATED ORAL 3 TIMES DAILY PRN
Qty: 30 TABLET | Refills: 0 | Status: SHIPPED | OUTPATIENT
Start: 2024-11-05

## 2024-11-05 RX ORDER — ONDANSETRON 4 MG/1
4 TABLET, FILM COATED ORAL EVERY 8 HOURS PRN
Qty: 10 TABLET | Refills: 0 | Status: SHIPPED | OUTPATIENT
Start: 2024-11-05

## 2024-11-05 ASSESSMENT — ENCOUNTER SYMPTOMS
CHEST TIGHTNESS: 0
VOMITING: 1
SHORTNESS OF BREATH: 0
BACK PAIN: 0
NAUSEA: 1
ABDOMINAL PAIN: 0
FACIAL SWELLING: 0

## 2024-11-05 ASSESSMENT — PAIN SCALES - GENERAL
PAINLEVEL_OUTOF10: 7
PAINLEVEL_OUTOF10: 0

## 2024-11-05 ASSESSMENT — PAIN DESCRIPTION - PAIN TYPE: TYPE: ACUTE PAIN

## 2024-11-05 ASSESSMENT — LIFESTYLE VARIABLES
HOW OFTEN DO YOU HAVE A DRINK CONTAINING ALCOHOL: MONTHLY OR LESS
HOW MANY STANDARD DRINKS CONTAINING ALCOHOL DO YOU HAVE ON A TYPICAL DAY: 1 OR 2

## 2024-11-05 ASSESSMENT — PAIN DESCRIPTION - LOCATION: LOCATION: HEAD

## 2024-11-05 ASSESSMENT — PAIN DESCRIPTION - DESCRIPTORS: DESCRIPTORS: ACHING

## 2024-11-05 ASSESSMENT — PAIN - FUNCTIONAL ASSESSMENT: PAIN_FUNCTIONAL_ASSESSMENT: 0-10

## 2024-11-05 NOTE — ED PROVIDER NOTES
Verbal Response: Oriented  Best Motor Response: Obeys commands  Shane Coma Scale Score: 15                        CIWA Assessment  BP: (!) 137/97  Pulse: 91             PHYSICAL EXAM  1 or more Elements     ED Triage Vitals [11/05/24 0912]   BP Systolic BP Percentile Diastolic BP Percentile Temp Temp Source Pulse Respirations SpO2   (!) 137/97 -- -- 98.3 °F (36.8 °C) Oral 91 16 98 %      Height Weight - Scale         -- 65.3 kg (144 lb)           Physical Exam    Constitutional:  Well developed, Well nourished.  No distress  HENT: Pupils are equally round and reactive to light. EOM. Sclerae white with no injection non- icterus. Nose without any rhinorrhea or epistaxis. Oral mucosa is moist. TM pearly white with no hemotympanum, erythema, or edema. Canals clear.  Normal hearing.   Neck: Supple, full range of motion, trachea midline  Cardiac:  Regular rate / rhythm. No murmurs, rubs, clicks or gallops.  Equal bilateral radial pulses.  Lungs: Respirations non-labored. Lungs are clear to auscultation. No wheezing, rhonchi or rales  Musculoskeletal: 5 out of 5 strength in all 4 extremities. Full flexion, extension, abduction, adduction, supination  and pronation of all extremities and all digits. No obvious muscle atrophy is noted. No focal muscle deficits are appreciated. There is no edema, asymmetry, or calf / thigh tenderness bilaterally. No cyanosis. Bilateral upper and lower extremity ROM intact without pain or obvious deficit  Integument: Skin is warm and dry.  Well-appearing repaired laceration over the left eyebrow.  No indication of infection.  Neurology:  Alert & oriented , No focal deficits noted.   Cranial nerves II through XII grossly intact.   Normal gross motor coordination & motor strength bilateral upper and lower extremities  Sensation intact. No evidence of incontinence or loss of bowel or bladder, no saddle anesthesia noted   Lymphatic: There is no submandibular or cervical adenopathy

## 2024-11-14 ENCOUNTER — TELEPHONE (OUTPATIENT)
Dept: FAMILY MEDICINE CLINIC | Age: 39
End: 2024-11-14

## 2024-11-14 NOTE — TELEPHONE ENCOUNTER
----- Message from Wilmar DEAL sent at 11/14/2024  4:38 PM EST -----  Regarding: [facility] Appointment Request  [facility] Appointment Request    Patient needs appointment for [facility] Appointment Type: New Patient.    Patient Requested Dates(s): soonest possible  Patient Requested Time: soonest possible  Provider Name:  any provider    Reason for Appointment Request: New Patient - Available appointments did not meet patient need  --------------------------------------------------------------------------------------------------------------------------    Relationship to Patient: Other ; Shanell Bains     Call Back Information: OK to leave message on voicemail  Preferred Call Back Number: Phone ; [phone]

## 2024-11-16 ENCOUNTER — APPOINTMENT (OUTPATIENT)
Dept: ULTRASOUND IMAGING | Age: 39
End: 2024-11-16
Payer: MEDICAID

## 2024-11-16 ENCOUNTER — APPOINTMENT (OUTPATIENT)
Dept: GENERAL RADIOLOGY | Age: 39
End: 2024-11-16
Payer: MEDICAID

## 2024-11-16 ENCOUNTER — HOSPITAL ENCOUNTER (EMERGENCY)
Age: 39
Discharge: HOME | End: 2024-11-16
Payer: MEDICAID

## 2024-11-16 VITALS
DIASTOLIC BLOOD PRESSURE: 108 MMHG | TEMPERATURE: 98 F | HEART RATE: 77 BPM | RESPIRATION RATE: 16 BRPM | SYSTOLIC BLOOD PRESSURE: 141 MMHG

## 2024-11-16 DIAGNOSIS — R55 SYNCOPE AND COLLAPSE: Primary | ICD-10-CM

## 2024-11-16 DIAGNOSIS — M79.605 LEFT LEG PAIN: ICD-10-CM

## 2024-11-16 LAB
ALBUMIN SERPL-MCNC: 3.7 G/DL (ref 3.4–5)
ALBUMIN/GLOB SERPL: 1.3 {RATIO} (ref 1.1–2.2)
ALP SERPL-CCNC: 129 U/L (ref 40–129)
ALT SERPL-CCNC: 40 U/L (ref 10–40)
ANION GAP SERPL CALCULATED.3IONS-SCNC: 11 MMOL/L (ref 9–17)
AST SERPL-CCNC: 50 U/L (ref 15–37)
BASOPHILS # BLD: 0.04 K/UL
BASOPHILS NFR BLD: 0 % (ref 0–1)
BILIRUB SERPL-MCNC: <0.2 MG/DL (ref 0–1)
BUN SERPL-MCNC: 8 MG/DL (ref 7–20)
CALCIUM SERPL-MCNC: 9.2 MG/DL (ref 8.3–10.6)
CHLORIDE SERPL-SCNC: 107 MMOL/L (ref 99–110)
CO2 SERPL-SCNC: 24 MMOL/L (ref 21–32)
CREAT SERPL-MCNC: 0.9 MG/DL (ref 0.9–1.3)
EOSINOPHIL # BLD: 0.07 K/UL
EOSINOPHILS RELATIVE PERCENT: 1 % (ref 0–3)
ERYTHROCYTE [DISTWIDTH] IN BLOOD BY AUTOMATED COUNT: 22 % (ref 11.7–14.9)
GFR, ESTIMATED: 88 ML/MIN/1.73M2
GLUCOSE SERPL-MCNC: 102 MG/DL (ref 74–99)
HCT VFR BLD AUTO: 36 % (ref 42–52)
HGB BLD-MCNC: 10.6 G/DL (ref 13.5–18)
IMM GRANULOCYTES # BLD AUTO: 0.03 K/UL
IMM GRANULOCYTES NFR BLD: 0 %
LYMPHOCYTES NFR BLD: 2.37 K/UL
LYMPHOCYTES RELATIVE PERCENT: 25 % (ref 24–44)
MAGNESIUM SERPL-MCNC: 2 MG/DL (ref 1.8–2.4)
MCH RBC QN AUTO: 25 PG (ref 27–31)
MCHC RBC AUTO-ENTMCNC: 29.4 G/DL (ref 32–36)
MCV RBC AUTO: 84.9 FL (ref 78–100)
MONOCYTES NFR BLD: 0.83 K/UL
MONOCYTES NFR BLD: 9 % (ref 0–4)
NEUTROPHILS NFR BLD: 65 % (ref 36–66)
NEUTS SEG NFR BLD: 6.26 K/UL
PLATELET # BLD AUTO: 402 K/UL (ref 140–440)
PMV BLD AUTO: 9.5 FL (ref 7.5–11.1)
POTASSIUM SERPL-SCNC: 3.8 MMOL/L (ref 3.5–5.1)
PROT SERPL-MCNC: 6.7 G/DL (ref 6.4–8.2)
RBC # BLD AUTO: 4.24 M/UL (ref 4.6–6.2)
SODIUM SERPL-SCNC: 141 MMOL/L (ref 136–145)
TROPONIN I SERPL HS-MCNC: 10 NG/L (ref 0–22)
TROPONIN I SERPL HS-MCNC: 14 NG/L (ref 0–22)
WBC OTHER # BLD: 9.6 K/UL (ref 4–10.5)

## 2024-11-16 PROCEDURE — 93971 EXTREMITY STUDY: CPT

## 2024-11-16 PROCEDURE — 80053 COMPREHEN METABOLIC PANEL: CPT

## 2024-11-16 PROCEDURE — 73564 X-RAY EXAM KNEE 4 OR MORE: CPT

## 2024-11-16 PROCEDURE — 73590 X-RAY EXAM OF LOWER LEG: CPT

## 2024-11-16 PROCEDURE — 83735 ASSAY OF MAGNESIUM: CPT

## 2024-11-16 PROCEDURE — 93005 ELECTROCARDIOGRAM TRACING: CPT | Performed by: PHYSICIAN ASSISTANT

## 2024-11-16 PROCEDURE — 84484 ASSAY OF TROPONIN QUANT: CPT

## 2024-11-16 PROCEDURE — 99285 EMERGENCY DEPT VISIT HI MDM: CPT

## 2024-11-16 PROCEDURE — 6370000000 HC RX 637 (ALT 250 FOR IP): Performed by: PHYSICIAN ASSISTANT

## 2024-11-16 PROCEDURE — 85025 COMPLETE CBC W/AUTO DIFF WBC: CPT

## 2024-11-16 PROCEDURE — 71045 X-RAY EXAM CHEST 1 VIEW: CPT

## 2024-11-16 RX ORDER — HYDROCODONE BITARTRATE AND ACETAMINOPHEN 5; 325 MG/1; MG/1
1 TABLET ORAL ONCE
Status: COMPLETED | OUTPATIENT
Start: 2024-11-16 | End: 2024-11-16

## 2024-11-16 RX ORDER — HYDROCODONE BITARTRATE AND ACETAMINOPHEN 5; 325 MG/1; MG/1
1 TABLET ORAL EVERY 4 HOURS PRN
Qty: 10 TABLET | Refills: 0 | Status: SHIPPED | OUTPATIENT
Start: 2024-11-16 | End: 2024-11-21

## 2024-11-16 RX ORDER — KETOROLAC TROMETHAMINE 15 MG/ML
30 INJECTION, SOLUTION INTRAMUSCULAR; INTRAVENOUS ONCE
Status: DISCONTINUED | OUTPATIENT
Start: 2024-11-16 | End: 2024-11-16 | Stop reason: HOSPADM

## 2024-11-16 RX ORDER — MELOXICAM 7.5 MG/1
7.5 TABLET ORAL 2 TIMES DAILY
Qty: 30 TABLET | Refills: 0 | Status: SHIPPED | OUTPATIENT
Start: 2024-11-16

## 2024-11-16 RX ADMIN — HYDROCODONE BITARTRATE AND ACETAMINOPHEN 1 TABLET: 5; 325 TABLET ORAL at 11:00

## 2024-11-16 RX ADMIN — HYDROCODONE BITARTRATE AND ACETAMINOPHEN 1 TABLET: 5; 325 TABLET ORAL at 08:39

## 2024-11-16 ASSESSMENT — PAIN SCALES - GENERAL
PAINLEVEL_OUTOF10: 10
PAINLEVEL_OUTOF10: 0
PAINLEVEL_OUTOF10: 10
PAINLEVEL_OUTOF10: 8

## 2024-11-16 ASSESSMENT — PAIN DESCRIPTION - LOCATION
LOCATION: KNEE
LOCATION: LEG
LOCATION: LEG

## 2024-11-16 ASSESSMENT — PAIN - FUNCTIONAL ASSESSMENT: PAIN_FUNCTIONAL_ASSESSMENT: 0-10

## 2024-11-16 ASSESSMENT — PAIN DESCRIPTION - ORIENTATION: ORIENTATION: RIGHT

## 2024-11-16 NOTE — ED PROVIDER NOTES
EMERGENCY DEPARTMENT ENCOUNTER        Pt Name: Maria Luz Gonsales  MRN: [id number]  Birthdate 1985  Date of evaluation: 11/16/2024  Provider: ARNOLD MELENDEZ  PCP: No primary care provider on file.    JEANNE. I have evaluated this patient.        Triage CHIEF COMPLAINT       Chief Complaint   Patient presents with    Fall         HISTORY OF PRESENT ILLNESS      Chief Complaint: Syncope and collapse, left leg pain, reoccurring headaches    Maria Luz Gonsales is a 39 y.o.  male who presents to the ED via private vehicle with a chief complaint of left leg pain.  Patient states that he had left leg pain after he \"collapsed\" coming out of his house yesterday.  He states he was walking down the steps and he just passed out.  No preceding symptoms.  He states he scraped his knee and injured his leg from this.  States that he has a history of cluster headaches, without a recent head injury which is exacerbated these headaches.  He denies hitting his head.  More concerned about his leg, has pain throughout the needle lower extremity, feels like it swollen.  Patient denies any other significant medical history.  No active chest pain no palpitations.     Nursing Notes were all reviewed and agreed with or any disagreements were addressed in the HPI.    REVIEW OF SYSTEMS     At least 10 systems reviewed and otherwise acutely negative except as in the [place].     PAST MEDICAL HISTORY     Past Medical History:   Diagnosis Date    Headache     Mitral valve prolapse        SURGICAL HISTORY   No past surgical history on file.    CURRENTMEDICATIONS       Discharge Medication List as of 11/16/2024 10:56 AM        CONTINUE these medications which have NOT CHANGED    Details   ondansetron (ZOFRAN) 4 MG tablet Take 1 tablet by mouth every 8 hours as needed for Nausea, Disp-10 tablet, R-0Normal      ibuprofen (ADVIL;MOTRIN) 600 MG tablet Take 1 tablet by mouth 3 times daily as needed for Pain, Disp-30 tablet, R-0Normal

## 2024-11-16 NOTE — ED NOTES
Patient to Nurses station requesting pain medication. Upset that he is waiting in the lobby. Alex speaking with patient at this time.

## 2024-11-16 NOTE — ED NOTES
Patient is complaining about having to sit in the lobby and not being in a bed. Redirected and educated about the process of triage.

## 2024-11-17 LAB
EKG ATRIAL RATE: 69 BPM
EKG DIAGNOSIS: NORMAL
EKG P AXIS: 118 DEGREES
EKG P-R INTERVAL: 148 MS
EKG Q-T INTERVAL: 418 MS
EKG QRS DURATION: 84 MS
EKG QTC CALCULATION (BAZETT): 444 MS
EKG R AXIS: 4 DEGREES
EKG T AXIS: 42 DEGREES
EKG VENTRICULAR RATE: 68 BPM

## 2024-11-17 PROCEDURE — 93010 ELECTROCARDIOGRAM REPORT: CPT | Performed by: INTERNAL MEDICINE

## 2024-11-20 ENCOUNTER — TELEPHONE (OUTPATIENT)
Dept: NEUROLOGY | Age: 39
End: 2024-11-20

## 2024-11-20 ENCOUNTER — OFFICE VISIT (OUTPATIENT)
Dept: NEUROLOGY | Age: 39
End: 2024-11-20
Payer: MEDICAID

## 2024-11-20 VITALS
BODY MASS INDEX: 20.16 KG/M2 | HEART RATE: 91 BPM | OXYGEN SATURATION: 99 % | HEIGHT: 71 IN | WEIGHT: 144 LBS | SYSTOLIC BLOOD PRESSURE: 144 MMHG | DIASTOLIC BLOOD PRESSURE: 84 MMHG

## 2024-11-20 DIAGNOSIS — S06.0X1D CONCUSSION WITH LOSS OF CONSCIOUSNESS OF 30 MINUTES OR LESS, SUBSEQUENT ENCOUNTER: ICD-10-CM

## 2024-11-20 DIAGNOSIS — G44.019 EPISODIC CLUSTER HEADACHE, NOT INTRACTABLE: Primary | ICD-10-CM

## 2024-11-20 DIAGNOSIS — R55 SYNCOPE, UNSPECIFIED SYNCOPE TYPE: ICD-10-CM

## 2024-11-20 DIAGNOSIS — M54.81 OCCIPITAL NEURALGIA OF LEFT SIDE: ICD-10-CM

## 2024-11-20 PROCEDURE — 99205 OFFICE O/P NEW HI 60 MIN: CPT | Performed by: STUDENT IN AN ORGANIZED HEALTH CARE EDUCATION/TRAINING PROGRAM

## 2024-11-20 PROCEDURE — 3079F DIAST BP 80-89 MM HG: CPT | Performed by: STUDENT IN AN ORGANIZED HEALTH CARE EDUCATION/TRAINING PROGRAM

## 2024-11-20 PROCEDURE — 3077F SYST BP >= 140 MM HG: CPT | Performed by: STUDENT IN AN ORGANIZED HEALTH CARE EDUCATION/TRAINING PROGRAM

## 2024-11-20 RX ORDER — PREDNISONE 10 MG/1
TABLET ORAL
Qty: 43 TABLET | Refills: 0 | Status: SHIPPED | OUTPATIENT
Start: 2024-11-20 | End: 2024-12-03

## 2024-11-20 RX ORDER — VERAPAMIL HYDROCHLORIDE 120 MG/1
TABLET, FILM COATED, EXTENDED RELEASE ORAL
Qty: 90 TABLET | Refills: 2 | Status: SHIPPED | OUTPATIENT
Start: 2024-11-20

## 2024-11-20 RX ORDER — ZOLMITRIPTAN 5 MG/1
5 TABLET, FILM COATED ORAL PRN
Qty: 6 TABLET | Refills: 3 | Status: SHIPPED | OUTPATIENT
Start: 2024-11-20 | End: 2024-11-21

## 2024-11-20 NOTE — TELEPHONE ENCOUNTER
Patient called stated ZOLMitriptan (ZOMIG) 5 MG tablet     verapamil (CALAN SR) 120 MG extended release tablet   Need prior authorization
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
## 2024-11-21 ENCOUNTER — TELEPHONE (OUTPATIENT)
Dept: NEUROLOGY | Age: 39
End: 2024-11-21

## 2024-11-21 ENCOUNTER — OFFICE VISIT (OUTPATIENT)
Dept: ORTHOPEDIC SURGERY | Age: 39
End: 2024-11-21
Payer: MEDICAID

## 2024-11-21 VITALS
BODY MASS INDEX: 20.58 KG/M2 | OXYGEN SATURATION: 100 % | HEART RATE: 78 BPM | RESPIRATION RATE: 14 BRPM | WEIGHT: 147 LBS | HEIGHT: 71 IN

## 2024-11-21 DIAGNOSIS — T14.8XXA ABRASION: Primary | ICD-10-CM

## 2024-11-21 DIAGNOSIS — G44.019 EPISODIC CLUSTER HEADACHE, NOT INTRACTABLE: Primary | ICD-10-CM

## 2024-11-21 PROCEDURE — 99203 OFFICE O/P NEW LOW 30 MIN: CPT

## 2024-11-21 RX ORDER — SUMATRIPTAN 6 MG/.5ML
6 INJECTION, SOLUTION SUBCUTANEOUS 2 TIMES DAILY PRN
Qty: 12 EACH | Refills: 3 | Status: SHIPPED | OUTPATIENT
Start: 2024-11-21 | End: 2024-11-22

## 2024-11-21 ASSESSMENT — ENCOUNTER SYMPTOMS
BACK PAIN: 0
FACIAL SWELLING: 0
RHINORRHEA: 0
SHORTNESS OF BREATH: 0
NAUSEA: 0
COUGH: 0

## 2024-11-21 NOTE — TELEPHONE ENCOUNTER
Patient called inquiring about his medications from yesterday.  Explained process of PA and let him know it was submitted to his insurance for review today and he can contact them to see what can help the process along.

## 2024-11-21 NOTE — TELEPHONE ENCOUNTER
Received call from Evita Weller stating Zolmitriptan denied due to not having tried two preferred meds x 14 days. Preferred meds include: Imitrex nasal spray, Naratriptan, Rizatriptan, and Sumatriptan.

## 2024-11-21 NOTE — PATIENT INSTRUCTIONS
Continue weight-bearing as tolerated.  Continue range of motion exercises as instructed.  Ice and elevate as needed.  Tylenol or Motrin for pain.  Wound care as talked about  Follow up as needed.

## 2024-11-21 NOTE — TELEPHONE ENCOUNTER
PA request denied zolmitriptan:    Coverage is provided when the member has a history of at least 14 days of therapy with at least two preferred medications which include but are not limited to: Imitrex nasal spray, naratriptan tablets, rizatriptan (oral disintegrating tablets and tablets), and sumatriptan (injection, nasal spray, and tablets)

## 2024-11-21 NOTE — PROGRESS NOTES
light touch intact throughout all surfaces of the left lower extremity.   Skin:     General: Skin is warm and dry.      Findings: Lesion present.   Neurological:      General: No focal deficit present.      Mental Status: He is alert and oriented to person, place, and time.   Psychiatric:         Mood and Affect: Mood normal.         Behavior: Behavior normal.        Diagnostic testing:  X-ray images were reviewed by myself and discussed with the patient:    Imaging results from 11/16/2024:  FINDINGS:  There is no evidence of fracture or other acute bony abnormality. No  bony lesions are seen. There is no joint effusion.     IMPRESSION:  Negative left knee    Office Procedures:  No orders of the defined types were placed in this encounter.    Assessment and Plan  1.  Abrasion, left knee    Continue weight-bearing as tolerated.  Continue range of motion exercises as instructed.  Ice and elevate as needed.  Tylenol or Motrin for pain.  Wound care as discussed in detail during patient visit.  Follow up as needed.    Electronically signed by Seth Cooper PA-C on 11/21/2024 at 3:38 PM

## 2024-11-22 ENCOUNTER — TELEPHONE (OUTPATIENT)
Dept: NEUROLOGY | Age: 39
End: 2024-11-22

## 2024-11-22 NOTE — TELEPHONE ENCOUNTER
Spoke with patient he stated pharmacy gave him vials of Imitrex and he does not know what to do with them and he does not like needles and would prefer a nasal spray.

## 2024-11-22 NOTE — TELEPHONE ENCOUNTER
Spoke with patient informed him nasal spray sent in and he can return vials to pharmacy for disposal.  Patient voiced understanding

## 2024-11-27 ENCOUNTER — TELEPHONE (OUTPATIENT)
Dept: NEUROLOGY | Age: 39
End: 2024-11-27

## 2024-11-27 NOTE — TELEPHONE ENCOUNTER
Patient called asking for prescription for Nasal SUMAtriptan be called into another pharmacy ([facility]) due to unable to obtain at current pharmacy for refill.  Explained to patient that he just got prescription on 11/22/2024 and it is only good for 30 day supply (12 ea) and he is taking it too much (Used all of them in 5 days)  Explained I would inform provider of situation and if he needs further intervention to go to an urgent care or emergency department.  Patient was insistent on speaking with provider and questioning why he was given medication with limits.  Explained overuse of medication best I could, patient kept referring to it says may use twice a day.  
Clear

## 2024-12-06 ENCOUNTER — TELEPHONE (OUTPATIENT)
Dept: NEUROLOGY | Age: 39
End: 2024-12-06

## 2024-12-06 NOTE — TELEPHONE ENCOUNTER
Received call back from pt informed that he still needs to complete standard and ambulatory EEGs. Informed of protocol and provided phone number to contact central scheduling.

## 2024-12-10 ENCOUNTER — OFFICE VISIT (OUTPATIENT)
Dept: FAMILY MEDICINE CLINIC | Age: 39
End: 2024-12-10

## 2024-12-10 ENCOUNTER — TELEPHONE (OUTPATIENT)
Dept: FAMILY MEDICINE CLINIC | Age: 39
End: 2024-12-10

## 2024-12-10 VITALS
BODY MASS INDEX: 18.18 KG/M2 | WEIGHT: 134.2 LBS | TEMPERATURE: 98.4 F | OXYGEN SATURATION: 99 % | DIASTOLIC BLOOD PRESSURE: 86 MMHG | HEART RATE: 110 BPM | SYSTOLIC BLOOD PRESSURE: 126 MMHG | HEIGHT: 72 IN

## 2024-12-10 DIAGNOSIS — Z71.6 ENCOUNTER FOR TOBACCO USE CESSATION COUNSELING: ICD-10-CM

## 2024-12-10 DIAGNOSIS — F17.210 TOBACCO DEPENDENCE DUE TO CIGARETTES: ICD-10-CM

## 2024-12-10 DIAGNOSIS — G44.001 INTRACTABLE CLUSTER HEADACHE SYNDROME, UNSPECIFIED CHRONICITY PATTERN: ICD-10-CM

## 2024-12-10 DIAGNOSIS — Z76.89 ENCOUNTER TO ESTABLISH CARE WITH NEW DOCTOR: Primary | ICD-10-CM

## 2024-12-10 DIAGNOSIS — Z11.4 ENCOUNTER FOR SCREENING FOR HIV: ICD-10-CM

## 2024-12-10 DIAGNOSIS — Z13.1 SCREENING FOR DIABETES MELLITUS: ICD-10-CM

## 2024-12-10 DIAGNOSIS — Z86.79 HISTORY OF MITRAL VALVE PROLAPSE: ICD-10-CM

## 2024-12-10 PROBLEM — I10 PRIMARY HYPERTENSION: Status: RESOLVED | Noted: 2023-11-08 | Resolved: 2024-12-10

## 2024-12-10 SDOH — ECONOMIC STABILITY: FOOD INSECURITY: WITHIN THE PAST 12 MONTHS, YOU WORRIED THAT YOUR FOOD WOULD RUN OUT BEFORE YOU GOT MONEY TO BUY MORE.: OFTEN TRUE

## 2024-12-10 SDOH — ECONOMIC STABILITY: INCOME INSECURITY: HOW HARD IS IT FOR YOU TO PAY FOR THE VERY BASICS LIKE FOOD, HOUSING, MEDICAL CARE, AND HEATING?: SOMEWHAT HARD

## 2024-12-10 SDOH — ECONOMIC STABILITY: FOOD INSECURITY: WITHIN THE PAST 12 MONTHS, THE FOOD YOU BOUGHT JUST DIDN'T LAST AND YOU DIDN'T HAVE MONEY TO GET MORE.: OFTEN TRUE

## 2024-12-10 NOTE — ASSESSMENT & PLAN NOTE
Improving, On nasal sumatriptan and Topamax, Verapamil as preventative med  Scheduled for MRI and following Neurology

## 2024-12-10 NOTE — PATIENT INSTRUCTIONS
I look forward to building a strong partnership with you as your primary care physician!    BRING YOUR MEDICATION BOTTLES TO ALL APPOINTMENTS    SIGN UP FOR MY CHART by calling [phone].   You can follow along your test results through [facility]. Please be aware you might view your lab results before I review your results, allow 24-48 hrs for updates to be provided.    Late cancellation policy: So that we can better accommodate people who are sick, please give our office 24 hour notice for an appointment cancellation.    Missed appointments: Your care is very important to us.  It is important that you keep your scheduled appointments.   Multiple missed appointments will lead to a dismissal from the office.    Patients arriving late will be worked into the schedule as time permits, with patients arriving on time taken as scheduled. Late arriving patients are more than welcome to wait or reschedule their appointments.

## 2024-12-10 NOTE — PROGRESS NOTES
Maria Luz Gonsales   YOB: 1985    Date of Visit:  12/10/2024    Chief complaint:  Here to re-establish care, prior PCP     HPI  Mr. Gonsales presents today to reestablish care with a primary care physician.  He complains of episodes of cluster headaches that has been going on for the last 2 months after a mild traumatic brain injury.  Also reported two episodes of passing out, followed by neurology and awaiting EEG and MRI.    On rescue and preventative meds with some improvement.    Hx of MVP, reports that he was diagnosed as a child- occasional SOB on moderate exertion, denies exertional dyspnea.  No record of baseline echocardiogram available.  Continues to smoke (10-year smoking history) trying to wean down by self currently had 7-10 sticks per day from 1 pack/day.    Past Medical History:   Diagnosis Date    Headache     Mitral valve prolapse      History reviewed. No pertinent surgical history.  Social History     Tobacco Use    Smoking status: Every Day     Current packs/day: 0.50     Average packs/day: 0.5 packs/day for 9.9 years (5.0 ttl pk-yrs)     Types: Cigarettes     Start date: 2015    Smokeless tobacco: Never   Substance Use Topics    Alcohol use: Not Currently     Alcohol/week: 10.0 standard drinks of alcohol     Types: 10 Shots of liquor per week       Review of Systems : A 12 point review of symptoms was obtained and negative unless mentioned in HPI.    No Known Allergies  Outpatient Medications Marked as Taking for the 12/10/24 encounter (Office Visit) with Kentrell Jeter MD   Medication Sig Dispense Refill    topiramate (TOPAMAX) 25 MG tablet Take 1 tab q AM x 7 days, then 1 tab BID x 7 days, then 2 tabs am and 1 tab PM x 7 days, then 2 tabs BID thereafter 60 tablet 0    topiramate (TOPAMAX) 50 MG tablet Take 1 tablet by mouth 2 times daily NOTE TO PHARMACY: DO NOT FILL UNTIL 25 MG RX IS COMPLETE 60 tablet 5    SUMAtriptan 10 MG/ACT SOLN 10 mg by Nasal route 2 times daily

## 2024-12-10 NOTE — ASSESSMENT & PLAN NOTE
Counseled on yearly screening and patient verbalized consent to screen. Denies any high risk sexual behavior. In heterosexual relationship

## 2024-12-10 NOTE — TELEPHONE ENCOUNTER
----- Message from Dr. Kentrell Jeter MD sent at 12/10/2024 11:15 AM EST -----  Regarding: ECHO  Can you please let patient know I ordered an echocardiogram to have a baseline in his file, I checked and didn't see he has any ECHO done in the past. It is good we have one on file that shows degree of his mitral valve prolapse. Thank you

## 2024-12-10 NOTE — ASSESSMENT & PLAN NOTE
Likely primary MVP.   Asymptomatic  No available ECHO in the system to stage  Will obtain baseline ECHO for staging and assessing MV function

## 2024-12-10 NOTE — ASSESSMENT & PLAN NOTE
Advised patient to quit and offered support.  Asked patient to set a quit date and inform me when they have done so.  Smoking cessation counseling provided. Individualized cessation plan includes nicotine use  Spent 3-10 minutes counseling patient on smoking cessation, discussing strategies and resources to support the patient in quitting.

## 2024-12-23 ENCOUNTER — HOSPITAL ENCOUNTER (OUTPATIENT)
Dept: MRI IMAGING | Age: 39
Discharge: HOME | End: 2024-12-23
Attending: STUDENT IN AN ORGANIZED HEALTH CARE EDUCATION/TRAINING PROGRAM
Payer: MEDICAID

## 2024-12-23 DIAGNOSIS — S06.0X1D CONCUSSION WITH LOSS OF CONSCIOUSNESS OF 30 MINUTES OR LESS, SUBSEQUENT ENCOUNTER: ICD-10-CM

## 2024-12-23 DIAGNOSIS — G44.019 EPISODIC CLUSTER HEADACHE, NOT INTRACTABLE: ICD-10-CM

## 2024-12-23 PROCEDURE — 70544 MR ANGIOGRAPHY HEAD W/O DYE: CPT

## 2024-12-23 PROCEDURE — 70551 MRI BRAIN STEM W/O DYE: CPT | Performed by: STUDENT IN AN ORGANIZED HEALTH CARE EDUCATION/TRAINING PROGRAM

## 2024-12-26 ENCOUNTER — HOSPITAL ENCOUNTER (OUTPATIENT)
Dept: NON INVASIVE DIAGNOSTICS | Age: 39
Discharge: HOME | End: 2024-12-28
Attending: STUDENT IN AN ORGANIZED HEALTH CARE EDUCATION/TRAINING PROGRAM
Payer: MEDICAID

## 2024-12-26 VITALS
WEIGHT: 134 LBS | HEIGHT: 72 IN | BODY MASS INDEX: 18.15 KG/M2 | HEART RATE: 110 BPM | SYSTOLIC BLOOD PRESSURE: 126 MMHG | DIASTOLIC BLOOD PRESSURE: 86 MMHG

## 2024-12-26 DIAGNOSIS — Z86.79 HISTORY OF MITRAL VALVE PROLAPSE: ICD-10-CM

## 2024-12-26 LAB
ECHO AO ROOT DIAM: 2.7 CM
ECHO AO ROOT INDEX: 1.5 CM/M2
ECHO AV AREA PEAK VELOCITY: 2.2 CM2
ECHO AV AREA VTI: 2 CM2
ECHO AV AREA/BSA PEAK VELOCITY: 1.2 CM2/M2
ECHO AV AREA/BSA VTI: 1.1 CM2/M2
ECHO AV MEAN GRADIENT: 5 MMHG
ECHO AV MEAN VELOCITY: 1.1 M/S
ECHO AV PEAK GRADIENT: 10 MMHG
ECHO AV PEAK VELOCITY: 1.6 M/S
ECHO AV VELOCITY RATIO: 0.69
ECHO AV VTI: 27.6 CM
ECHO BSA: 1.76 M2
ECHO IVC PROX: 1.4 CM
ECHO LA AREA 4C: 12.8 CM2
ECHO LA DIAMETER INDEX: 1.61 CM/M2
ECHO LA DIAMETER: 2.9 CM
ECHO LA MAJOR AXIS: 5 CM
ECHO LA TO AORTIC ROOT RATIO: 1.07
ECHO LA VOL MOD A4C: 22 ML (ref 18–58)
ECHO LA VOLUME INDEX MOD A4C: 12 ML/M2 (ref 16–34)
ECHO LV E' LATERAL VELOCITY: 9.1 CM/S
ECHO LV E' SEPTAL VELOCITY: 8.7 CM/S
ECHO LV EDV A4C: 119 ML
ECHO LV EDV INDEX A4C: 66 ML/M2
ECHO LV EF PHYSICIAN: 50 %
ECHO LV EJECTION FRACTION A4C: 46 %
ECHO LV ESV A4C: 64 ML
ECHO LV ESV INDEX A4C: 36 ML/M2
ECHO LV FRACTIONAL SHORTENING: 13 % (ref 28–44)
ECHO LV INTERNAL DIMENSION DIASTOLE INDEX: 2.5 CM/M2
ECHO LV INTERNAL DIMENSION DIASTOLIC: 4.5 CM (ref 4.2–5.9)
ECHO LV INTERNAL DIMENSION SYSTOLIC INDEX: 2.17 CM/M2
ECHO LV INTERNAL DIMENSION SYSTOLIC: 3.9 CM
ECHO LV IVSD: 1.1 CM (ref 0.6–1)
ECHO LV MASS 2D: 153.3 G (ref 88–224)
ECHO LV MASS INDEX 2D: 85.2 G/M2 (ref 49–115)
ECHO LV POSTERIOR WALL DIASTOLIC: 0.9 CM (ref 0.6–1)
ECHO LV RELATIVE WALL THICKNESS RATIO: 0.4
ECHO LVOT AREA: 3.1 CM2
ECHO LVOT AV VTI INDEX: 0.62
ECHO LVOT DIAM: 2 CM
ECHO LVOT MEAN GRADIENT: 2 MMHG
ECHO LVOT PEAK GRADIENT: 4 MMHG
ECHO LVOT PEAK VELOCITY: 1.1 M/S
ECHO LVOT STROKE VOLUME INDEX: 29.8 ML/M2
ECHO LVOT SV: 53.7 ML
ECHO LVOT VTI: 17.1 CM
ECHO MV A VELOCITY: 0.89 M/S
ECHO MV E DECELERATION TIME (DT): 162 MS
ECHO MV E VELOCITY: 0.69 M/S
ECHO MV E/A RATIO: 0.78
ECHO MV E/E' LATERAL: 7.58
ECHO MV E/E' RATIO (AVERAGED): 7.76
ECHO MV E/E' SEPTAL: 7.93
ECHO RV MID DIMENSION: 3.4 CM

## 2024-12-26 PROCEDURE — 93306 TTE W/DOPPLER COMPLETE: CPT

## 2024-12-26 PROCEDURE — 93306 TTE W/DOPPLER COMPLETE: CPT | Performed by: INTERNAL MEDICINE

## 2024-12-30 ENCOUNTER — HOSPITAL ENCOUNTER (OUTPATIENT)
Dept: SLEEP CENTER | Age: 39
Discharge: HOME | End: 2024-12-30
Attending: STUDENT IN AN ORGANIZED HEALTH CARE EDUCATION/TRAINING PROGRAM

## 2024-12-30 DIAGNOSIS — R55 SYNCOPE, UNSPECIFIED SYNCOPE TYPE: ICD-10-CM

## 2025-01-02 ENCOUNTER — HOSPITAL ENCOUNTER (EMERGENCY)
Age: 40
Discharge: HOME | End: 2025-01-02
Attending: EMERGENCY MEDICINE
Payer: MEDICAID

## 2025-01-02 ENCOUNTER — HOSPITAL ENCOUNTER (OUTPATIENT)
Dept: SLEEP CENTER | Age: 40
Discharge: HOME | End: 2025-01-02
Attending: STUDENT IN AN ORGANIZED HEALTH CARE EDUCATION/TRAINING PROGRAM
Payer: MEDICAID

## 2025-01-02 VITALS
DIASTOLIC BLOOD PRESSURE: 95 MMHG | HEART RATE: 81 BPM | TEMPERATURE: 98.1 F | RESPIRATION RATE: 16 BRPM | SYSTOLIC BLOOD PRESSURE: 131 MMHG | OXYGEN SATURATION: 96 %

## 2025-01-02 DIAGNOSIS — K08.89 PAIN, DENTAL: Primary | ICD-10-CM

## 2025-01-02 DIAGNOSIS — R51.9 FACIAL PAIN: ICD-10-CM

## 2025-01-02 PROCEDURE — 95816 EEG AWAKE AND DROWSY: CPT | Performed by: STUDENT IN AN ORGANIZED HEALTH CARE EDUCATION/TRAINING PROGRAM

## 2025-01-02 PROCEDURE — 99283 EMERGENCY DEPT VISIT LOW MDM: CPT

## 2025-01-02 PROCEDURE — 95819 EEG AWAKE AND ASLEEP: CPT

## 2025-01-02 RX ORDER — PENICILLIN V POTASSIUM 500 MG/1
500 TABLET, FILM COATED ORAL 4 TIMES DAILY
Qty: 40 TABLET | Refills: 0 | Status: SHIPPED | OUTPATIENT
Start: 2025-01-02 | End: 2025-01-12

## 2025-01-02 ASSESSMENT — PAIN DESCRIPTION - LOCATION: LOCATION: MOUTH

## 2025-01-02 ASSESSMENT — PAIN - FUNCTIONAL ASSESSMENT: PAIN_FUNCTIONAL_ASSESSMENT: 0-10

## 2025-01-02 ASSESSMENT — PAIN SCALES - GENERAL: PAINLEVEL_OUTOF10: 7

## 2025-01-02 ASSESSMENT — PAIN DESCRIPTION - ORIENTATION: ORIENTATION: LEFT

## 2025-01-02 NOTE — ED PROVIDER NOTES
Emergency Department Encounter    Patient: Maria Luz Gonsales  MRN: [id number]  : 1985  Date of Evaluation: 2025  ED Provider:  Lyn Portillo MD    Triage Chief Complaint:   Dental Pain (Left side, was supposed to have root canal in November )    [place]:  Maria Luz Gonsales is a 39 y.o. male that presents with complaint of left-sided dental pain.  He is post to have a root canal, at the left back molar.  They put something in there previously and it fell out.  Now he feels like it is starting to get infected, has had more pain over the last 2 days.  He does not want to become an abscess.  No fevers.  Does have some facial pain.  No swelling.  No difficulty opening his mouth or swallowing    ROS - see HPI, below listed is current ROS at time of my eval:  4 systems reviewed and negative except as above.     Past Medical History:   Diagnosis Date    Headache     Mitral valve prolapse      History reviewed. No pertinent surgical history.  History reviewed. No pertinent family history.  Social History     Socioeconomic History    Marital status: Single     Spouse name: Not on file    Number of children: Not on file    Years of education: Not on file    Highest education level: Not on file   Occupational History    Not on file   Tobacco Use    Smoking status: Every Day     Current packs/day: 0.50     Average packs/day: 0.5 packs/day for 10.0 years (5.0 ttl pk-yrs)     Types: Cigarettes     Start date:     Smokeless tobacco: Never   Substance and Sexual Activity    Alcohol use: Not Currently     Alcohol/week: 10.0 standard drinks of alcohol     Types: 10 Shots of liquor per week    Drug use: Yes     Types: Marijuana (Weed)    Sexual activity: Yes     Partners: Female   Other Topics Concern    Not on file   Social History Narrative    Not on file     Social Determinants of Health     Financial Resource Strain: Medium Risk (12/10/2024)    Overall Financial Resource Strain (CARDIA)     Difficulty of Paying Living

## 2025-01-02 NOTE — PROGRESS NOTES
Routine outpatient EEG completed and awaiting interpretation.       Electronically signed by Oscar Kim on 1/2/2025 at 10:27 AM

## 2025-01-03 NOTE — PROCEDURES
PATIENT/VISITOR WELLNESS SCREENING    Step 1 Patient Screening    1. In the last month, have you been in contact with someone who was confirmed or suspected to have Coronavirus/COVID-19? No    2. Do you have the following symptoms?  Fever/Chills? No   Cough? No   Shortness of breath? No   New loss of taste or smell? No  Sore throat? No  Muscle or body aches? No  Headaches? No  Fatigue? No  Vomiting or diarrhea? No       ROUTINE ELECTROENCEPHALOGRAM    Identifying Information:  Name: Maria Luz Gonsales  MRN: [id number]  : 1985  Interpreting Physician: Yue Robin DO  Referring Provider: Delmis Garces DO  Date of EE25  Procedure Location: Outpatient     Clinical History:  Maria Luz Gonsales is a 39 y.o. male with concerns for seizure like activity.     Current Medications:     Current Outpatient Medications   Medication Instructions    ibuprofen (ADVIL;MOTRIN) 600 mg, Oral, 3 TIMES DAILY PRN    meloxicam (MOBIC) 7.5 mg, Oral, 2 times daily    ondansetron (ZOFRAN) 4 mg, Oral, EVERY 8 HOURS PRN    penicillin v potassium (VEETID) 500 mg, Oral, 4 TIMES DAILY    SUMAtriptan 10 mg, Nasal, 2 TIMES DAILY PRN    topiramate (TOPAMAX) 25 MG tablet Take 1 tab q AM x 7 days, then 1 tab BID x 7 days, then 2 tabs am and 1 tab PM x 7 days, then 2 tabs BID thereafter    topiramate (TOPAMAX) 50 mg, Oral, 2 TIMES DAILY, NOTE TO PHARMACY: DO NOT FILL UNTIL 25 MG RX IS COMPLETE    verapamil (CALAN SR) 120 MG extended release tablet 1 tab nightly for 3 days, then 2 tabs nightly at bedtime         Indication:  Rule out seizure/seizure disorder     Technical Summary:  28 channels of EEG were recorded in a digital format on a patient who was reported to be awake and drowsy during the recording. The patient was not sleep deprived prior to the EEG.     The PDR consisted of well-developed, well-regulated 9-10 Hz alpha activity, maximal over the posterior head regions and reactive to eye opening and closure.     Photic stimulation was performed and did not produce any abnormalities. During the recording stage II sleep was not seen, but drowsiness did occur.     The EKG lead revealed no rhythm abnormalities.       EEG Interpretation:   This EEG was within normal limits for a patient of this age in the awake and drowsy states. No focal, lateralizing, or epileptiform features were seen during the recording.       Clinical correlation is

## 2025-01-09 ENCOUNTER — TELEPHONE (OUTPATIENT)
Age: 40
End: 2025-01-09

## 2025-01-09 PROBLEM — Z11.4 ENCOUNTER FOR SCREENING FOR HIV: Status: RESOLVED | Noted: 2024-12-10 | Resolved: 2025-01-09

## 2025-02-25 ENCOUNTER — TELEPHONE (OUTPATIENT)
Age: 40
End: 2025-02-25

## 2025-02-25 NOTE — TELEPHONE ENCOUNTER
Received call from pt stating he hasn't heard from [facility] to schedule ambulatory EEG and that he's now having issues with light sensitivity. He stated that his headaches have been managed with Topamax but has issues with light sensitivity when turning on lights or if goes outside with snow on the ground then incredibly sensitive. Advised that he contact [facility] and provided phone number and informed that Dr. Garces would be notified of light sensitivity issues. Please advise.

## 2025-02-27 NOTE — TELEPHONE ENCOUNTER
Notified pt of info and instructions per Dr. Garces, he voiced understanding. Scheduled for follow up with Nabeel 3/6/25.

## 2025-03-12 ENCOUNTER — OFFICE VISIT (OUTPATIENT)
Age: 40
End: 2025-03-12
Payer: MEDICAID

## 2025-03-12 VITALS
OXYGEN SATURATION: 98 % | BODY MASS INDEX: 18.63 KG/M2 | SYSTOLIC BLOOD PRESSURE: 122 MMHG | HEART RATE: 117 BPM | WEIGHT: 137.4 LBS | DIASTOLIC BLOOD PRESSURE: 74 MMHG

## 2025-03-12 DIAGNOSIS — G44.019 EPISODIC CLUSTER HEADACHE, NOT INTRACTABLE: ICD-10-CM

## 2025-03-12 PROCEDURE — 99212 OFFICE O/P EST SF 10 MIN: CPT | Performed by: NURSE PRACTITIONER

## 2025-03-12 PROCEDURE — 99213 OFFICE O/P EST LOW 20 MIN: CPT | Performed by: NURSE PRACTITIONER

## 2025-03-12 RX ORDER — VERAPAMIL HYDROCHLORIDE 240 MG/1
240 TABLET, FILM COATED, EXTENDED RELEASE ORAL NIGHTLY
Qty: 90 TABLET | Refills: 1 | Status: SHIPPED | OUTPATIENT
Start: 2025-03-12

## 2025-04-01 ENCOUNTER — TELEPHONE (OUTPATIENT)
Dept: FAMILY MEDICINE CLINIC | Age: 40
End: 2025-04-01

## 2025-04-01 NOTE — TELEPHONE ENCOUNTER
[facility] Clinical Staff  ECC Message to Provider    Relationship to Patient: Covered Entity ( [facility] )    Additional Information: Representative is calling to ask for an update if the office received the paper works/medical records that they faxed over.  --------------------------------------------------------------------------------------------------------------------------    Call Back Information: OK to leave message on voicemail  Preferred Call Back Number: Phone [phone]    Spoke with pt in [place] 3/29/25 received  records request

## 2025-06-13 ENCOUNTER — OFFICE VISIT (OUTPATIENT)
Dept: FAMILY MEDICINE CLINIC | Age: 40
End: 2025-06-13

## 2025-06-13 VITALS
OXYGEN SATURATION: 96 % | HEART RATE: 112 BPM | WEIGHT: 127 LBS | DIASTOLIC BLOOD PRESSURE: 88 MMHG | HEIGHT: 72 IN | BODY MASS INDEX: 17.2 KG/M2 | SYSTOLIC BLOOD PRESSURE: 126 MMHG

## 2025-06-13 DIAGNOSIS — Z71.89 GRIEF COUNSELING: ICD-10-CM

## 2025-06-13 DIAGNOSIS — Z11.59 NEED FOR HEPATITIS C SCREENING TEST: ICD-10-CM

## 2025-06-13 DIAGNOSIS — Z13.1 SCREENING FOR DIABETES MELLITUS: ICD-10-CM

## 2025-06-13 DIAGNOSIS — Z13.220 SCREENING FOR HYPERLIPIDEMIA: ICD-10-CM

## 2025-06-13 DIAGNOSIS — H54.7 VISUAL ACUITY REDUCED: ICD-10-CM

## 2025-06-13 DIAGNOSIS — Z00.01 ENCOUNTER FOR WELL ADULT EXAM WITH ABNORMAL FINDINGS: Primary | ICD-10-CM

## 2025-06-13 DIAGNOSIS — F10.10 EXCESSIVE DRINKING ALCOHOL: ICD-10-CM

## 2025-06-13 DIAGNOSIS — K64.4 EXTERNAL HEMORRHOIDS: ICD-10-CM

## 2025-06-13 DIAGNOSIS — R00.0 TACHYCARDIA: ICD-10-CM

## 2025-06-13 DIAGNOSIS — Z11.4 ENCOUNTER FOR SCREENING FOR HIV: ICD-10-CM

## 2025-06-13 DIAGNOSIS — R63.4 WEIGHT LOSS: ICD-10-CM

## 2025-06-13 DIAGNOSIS — Z87.891 PERSONAL HISTORY OF TOBACCO USE, PRESENTING HAZARDS TO HEALTH: ICD-10-CM

## 2025-06-13 SDOH — ECONOMIC STABILITY: FOOD INSECURITY: WITHIN THE PAST 12 MONTHS, YOU WORRIED THAT YOUR FOOD WOULD RUN OUT BEFORE YOU GOT MONEY TO BUY MORE.: NEVER TRUE

## 2025-06-13 SDOH — ECONOMIC STABILITY: FOOD INSECURITY: WITHIN THE PAST 12 MONTHS, THE FOOD YOU BOUGHT JUST DIDN'T LAST AND YOU DIDN'T HAVE MONEY TO GET MORE.: NEVER TRUE

## 2025-06-13 ASSESSMENT — PATIENT HEALTH QUESTIONNAIRE - PHQ9
1. LITTLE INTEREST OR PLEASURE IN DOING THINGS: NOT AT ALL
SUM OF ALL RESPONSES TO PHQ QUESTIONS 1-9: 0
SUM OF ALL RESPONSES TO PHQ QUESTIONS 1-9: 0
2. FEELING DOWN, DEPRESSED OR HOPELESS: NOT AT ALL
SUM OF ALL RESPONSES TO PHQ QUESTIONS 1-9: 0
SUM OF ALL RESPONSES TO PHQ QUESTIONS 1-9: 0

## 2025-06-13 NOTE — PROGRESS NOTES
Well Adult Note  Name: Maria Luz Gonsales Today’s Date: 6/15/2025   MRN: [id number] Sex: Male   Age: 39 y.o. Ethnicity:  /    : 1985 Race: Black /       Maria Luz Gonsales is here for a well adult exam.       Assessment & Plan   Encounter for well adult exam with abnormal findings  Comments:  Declined all vaccinations today despite education  Reviewed benefits of flu, Tdap, pneumococcal, and COVID boosters  Will revisit at follow-up  Orders:  -     Comprehensive Metabolic Panel  Screening for diabetes mellitus  -     Hemoglobin A1C - Lab draw  Need for hepatitis C screening test  -     Hepatitis C Antibody; Future  Screening for hyperlipidemia  -     Lipid Panel  Encounter for screening for HIV  -     HIV Screen  Personal history of tobacco use, presenting hazards to health  -     [place] TOBACCO USE CESSATION INTERMEDIATE 3-10 MINUTES [01134]  Visual acuity reduced  Comments:  Advised to schedule comprehensive eye exam with optometry/ophthalmology  Orders:  -     Amb External Referral To Ophthalmology  Tachycardia  Comments:  likely multifactorial: anxiety, alcohol use, deconditioning  EKG, Holter monitor, TSH  Will reassess based on results and cardiology referral as needed  Orders:  -     Cardiac holter monitor (<= 48 hours); Future  -     TSH reflex to FT4  -     EKG 12 lead; Future  Excessive drinking alcohol  Comments:  Screened positive for concerning use patterns  Unaware his drinking is excessive, was counseled on risks and verbaized he's going to cut back  External hemorrhoids  Comments:  high-fiber diet, increased fluids, Sitz baths  Anusol cream PRN  Referral to GI if symptoms persist or worsen  Weight loss  -     Vitamin D 25 Hydroxy  Grief counseling    - Alcohol use and smoking counseling provided  - declined vaccinations despite education      Return in 1 month (on 2025) for weight follow up and tachycardia.       Subjective   History:  He presents today for well

## 2025-06-13 NOTE — PATIENT INSTRUCTIONS
adapted under license by [employer]. If you have questions about a medical condition or this instruction, always ask your healthcare professional. [employer] disclaims any warranty or liability for your use of this information.

## 2025-06-14 LAB
25(OH)D3 SERPL-MCNC: 10.6 NG/ML
ALBUMIN SERPL-MCNC: 4.5 G/DL (ref 3.4–5)
ALBUMIN/GLOB SERPL: 1.6 {RATIO} (ref 1.1–2.2)
ALP SERPL-CCNC: 96 U/L (ref 40–129)
ALT SERPL-CCNC: 62 U/L (ref 10–40)
ANION GAP SERPL CALCULATED.3IONS-SCNC: 17 MMOL/L (ref 3–16)
AST SERPL-CCNC: 87 U/L (ref 15–37)
BILIRUB SERPL-MCNC: 0.5 MG/DL (ref 0–1)
BUN SERPL-MCNC: 9 MG/DL (ref 7–20)
CALCIUM SERPL-MCNC: 9.8 MG/DL (ref 8.3–10.6)
CHLORIDE SERPL-SCNC: 103 MMOL/L (ref 99–110)
CHOLEST SERPL-MCNC: 227 MG/DL (ref 0–199)
CO2 SERPL-SCNC: 25 MMOL/L (ref 21–32)
CREAT SERPL-MCNC: 1 MG/DL (ref 0.9–1.3)
EST. AVERAGE GLUCOSE BLD GHB EST-MCNC: 99.7 MG/DL
GFR SERPLBLD CREATININE-BSD FMLA CKD-EPI: >90 ML/MIN/{1.73_M2}
GLUCOSE SERPL-MCNC: 92 MG/DL (ref 70–99)
HBA1C MFR BLD: 5.1 %
HDLC SERPL-MCNC: 68 MG/DL (ref 40–60)
HIV 1+2 AB+HIV1 P24 AG SERPL QL IA: NORMAL
HIV 2 AB SERPL QL IA: NORMAL
HIV1 AB SERPL QL IA: NORMAL
HIV1 P24 AG SERPL QL IA: NORMAL
LDLC SERPL CALC-MCNC: 141 MG/DL
POTASSIUM SERPL-SCNC: 4.4 MMOL/L (ref 3.5–5.1)
PROT SERPL-MCNC: 7.3 G/DL (ref 6.4–8.2)
SODIUM SERPL-SCNC: 145 MMOL/L (ref 136–145)
TRIGL SERPL-MCNC: 91 MG/DL (ref 0–150)
TSH SERPL DL<=0.005 MIU/L-ACNC: 0.79 UIU/ML (ref 0.27–4.2)
VLDLC SERPL CALC-MCNC: 18 MG/DL

## 2025-06-15 RX ORDER — POLYETHYLENE GLYCOL 3350 17 G/17G
17 POWDER, FOR SOLUTION ORAL PRN
Qty: 510 G | Refills: 0 | Status: SHIPPED | OUTPATIENT
Start: 2025-06-15 | End: 2025-07-15

## 2025-06-15 RX ORDER — POLYETHYLENE GLYCOL 3350 17 G/17G
17 POWDER, FOR SOLUTION ORAL DAILY
Qty: 510 G | Refills: 0 | Status: SHIPPED | OUTPATIENT
Start: 2025-06-15 | End: 2025-06-15

## 2025-06-15 RX ORDER — HYDROCORTISONE 25 MG/G
CREAM TOPICAL
Qty: 1 EACH | Refills: 1 | Status: SHIPPED | OUTPATIENT
Start: 2025-06-15

## 2025-06-16 ENCOUNTER — RESULTS FOLLOW-UP (OUTPATIENT)
Dept: FAMILY MEDICINE CLINIC | Age: 40
End: 2025-06-16

## 2025-06-16 DIAGNOSIS — R74.8 ELEVATED LIVER ENZYMES: Primary | ICD-10-CM

## 2025-06-16 DIAGNOSIS — R74.8 ELEVATED LIVER ENZYMES: ICD-10-CM

## 2025-06-16 LAB
HAV IGM SERPL QL IA: NORMAL
HBV CORE IGM SERPL QL IA: NORMAL
HCV AB SERPL QL IA: NORMAL

## 2025-06-16 RX ORDER — ERGOCALCIFEROL 1.25 MG/1
50000 CAPSULE, LIQUID FILLED ORAL WEEKLY
Qty: 12 CAPSULE | Refills: 0 | Status: SHIPPED | OUTPATIENT
Start: 2025-06-16

## 2025-07-15 ENCOUNTER — OFFICE VISIT (OUTPATIENT)
Dept: FAMILY MEDICINE CLINIC | Age: 40
End: 2025-07-15
Payer: MEDICAID

## 2025-07-15 VITALS
SYSTOLIC BLOOD PRESSURE: 150 MMHG | BODY MASS INDEX: 17.36 KG/M2 | OXYGEN SATURATION: 99 % | WEIGHT: 128 LBS | HEART RATE: 91 BPM | DIASTOLIC BLOOD PRESSURE: 90 MMHG

## 2025-07-15 DIAGNOSIS — E78.00 PURE HYPERCHOLESTEROLEMIA: ICD-10-CM

## 2025-07-15 DIAGNOSIS — K64.4 EXTERNAL HEMORRHOIDS: Primary | ICD-10-CM

## 2025-07-15 DIAGNOSIS — E55.9 VITAMIN D DEFICIENCY: ICD-10-CM

## 2025-07-15 DIAGNOSIS — K04.7 DENTAL INFECTION: ICD-10-CM

## 2025-07-15 PROCEDURE — 99213 OFFICE O/P EST LOW 20 MIN: CPT | Performed by: STUDENT IN AN ORGANIZED HEALTH CARE EDUCATION/TRAINING PROGRAM

## 2025-07-15 RX ORDER — IBUPROFEN 600 MG/1
600 TABLET, FILM COATED ORAL 3 TIMES DAILY PRN
Qty: 30 TABLET | Refills: 0 | Status: SHIPPED | OUTPATIENT
Start: 2025-07-15

## 2025-07-15 SDOH — ECONOMIC STABILITY: FOOD INSECURITY: WITHIN THE PAST 12 MONTHS, THE FOOD YOU BOUGHT JUST DIDN'T LAST AND YOU DIDN'T HAVE MONEY TO GET MORE.: NEVER TRUE

## 2025-07-15 SDOH — ECONOMIC STABILITY: FOOD INSECURITY: WITHIN THE PAST 12 MONTHS, YOU WORRIED THAT YOUR FOOD WOULD RUN OUT BEFORE YOU GOT MONEY TO BUY MORE.: NEVER TRUE

## 2025-07-15 NOTE — PROGRESS NOTES
Maria Luz Gonsales   YOB: 1985  Date of Visit:  7/15/2025  Assessment/Plan   1. External hemorrhoids  -     Phenylephrine-[place] 0.25-50 % GEL; Apply 1 application  topically in the morning and at bedtime, Disp-51 g, R-0Normal  -     Jack Fuentes DO, General Surgery, [place]  2. Dental infection  -     amoxicillin-clavulanate (AUGMENTIN) 875-125 MG per tablet; Take 1 tablet by mouth 2 times daily for 7 days, Disp-14 tablet, R-0Normal  -     ibuprofen (ADVIL;MOTRIN) 600 MG tablet; Take 1 tablet by mouth 3 times daily as needed for Pain, Disp-30 tablet, R-0Normal  3. Pure hypercholesterolemia  Comments:  discussed lifestyle changes- low fat, low chlesterol diet  The 10-year ASCVD risk score (Jose CABRERA, et al., 2019) is: 7%  - tachycardia improved and weight steady gain  - advised to make an appointment with a new dentist asap    Medications reviewed with the patient.  Appropriate prescriptions are addressed. After visit nichole provided.  Follow up as directed, sooner if needed.  Questions answered and patient verbalizes understanding. Call for any problems, questions, or concerns.     Return in about 7 months (around 2/18/2026).      SUBJECTIVE:    Here today for follow up of weight loss and tachycardia  He gained 1 lbs since last seen a month ago. Says his appetite is better but limited with current dental pain around his molar, was scheduled but removal but lost insurance coverage. Has been having severe dental pain in last three days mildly improved with ibuprofen.  His HR is normal today but BP elevated suspected might be from pain  Labs done prior showed normal TSH however elevated liver enzymes, hepatitis panel normal. Liver USS pending  He also complains his hemorrhoids still bothers him, last anusol cream didn't help as much. Wanting referral to surgery for removal.    Review of Systems : A 12 point review of symptoms was obtained and negative unless mentioned in

## 2025-07-16 PROBLEM — E78.00 PURE HYPERCHOLESTEROLEMIA: Status: ACTIVE | Noted: 2025-07-16

## 2025-07-16 PROBLEM — E55.9 VITAMIN D DEFICIENCY: Status: ACTIVE | Noted: 2025-07-16

## 2025-07-24 ENCOUNTER — OFFICE VISIT (OUTPATIENT)
Dept: BARIATRICS/WEIGHT MGMT | Age: 40
End: 2025-07-24
Payer: MEDICAID

## 2025-07-24 VITALS
OXYGEN SATURATION: 97 % | WEIGHT: 131.9 LBS | HEIGHT: 72 IN | SYSTOLIC BLOOD PRESSURE: 102 MMHG | DIASTOLIC BLOOD PRESSURE: 72 MMHG | HEART RATE: 89 BPM | BODY MASS INDEX: 17.87 KG/M2

## 2025-07-24 DIAGNOSIS — K64.4 EXTERNAL HEMORRHOIDS: Primary | ICD-10-CM

## 2025-07-24 PROCEDURE — 99204 OFFICE O/P NEW MOD 45 MIN: CPT | Performed by: SURGERY

## 2025-07-24 RX ORDER — SODIUM CHLORIDE 9 MG/ML
INJECTION, SOLUTION INTRAVENOUS PRN
OUTPATIENT
Start: 2025-07-24

## 2025-07-24 RX ORDER — SODIUM CHLORIDE, SODIUM LACTATE, POTASSIUM CHLORIDE, CALCIUM CHLORIDE 600; 310; 30; 20 MG/100ML; MG/100ML; MG/100ML; MG/100ML
INJECTION, SOLUTION INTRAVENOUS CONTINUOUS
OUTPATIENT
Start: 2025-07-24

## 2025-07-24 RX ORDER — SODIUM CHLORIDE 0.9 % (FLUSH) 0.9 %
5-40 SYRINGE (ML) INJECTION PRN
OUTPATIENT
Start: 2025-07-24

## 2025-07-24 RX ORDER — SODIUM CHLORIDE 0.9 % (FLUSH) 0.9 %
5-40 SYRINGE (ML) INJECTION EVERY 12 HOURS SCHEDULED
OUTPATIENT
Start: 2025-07-24

## 2025-07-24 ASSESSMENT — ENCOUNTER SYMPTOMS
BLOOD IN STOOL: 1
RECTAL PAIN: 1
ANAL BLEEDING: 1

## 2025-07-24 NOTE — PROGRESS NOTES
Chief Complaint   Patient presents with    Consultation     NP - External hemorrhoids, bleeds at times after a BM         SUBJECTIVE:  HPI: Patient is here with complaints of:    Symptomatic external hemorrhoid.  Worsening symptoms.  Having BM. Regular.   Otc treatments failed.    I have reviewed the patient's(pertinent information to this visit) medical history, family history(scanned in  the [employer] under \"patient questioner\"), social history and review of systems with the patient today in the office.            No past surgical history on file.  Past Medical History:   Diagnosis Date    Headache     Mitral valve prolapse      No family history on file.  Social History     Socioeconomic History    Marital status: Single     Spouse name: Not on file    Number of children: Not on file    Years of education: Not on file    Highest education level: Not on file   Occupational History    Not on file   Tobacco Use    Smoking status: Every Day     Current packs/day: 0.80     Average packs/day: 0.8 packs/day for 11.6 years (9.2 ttl pk-yrs)     Types: Cigarettes     Start date: 2014    Smokeless tobacco: Never   Substance and Sexual Activity    Alcohol use: Not Currently     Alcohol/week: 10.0 standard drinks of alcohol     Types: 10 Shots of liquor per week    Drug use: Yes     Types: Marijuana (Weed)    Sexual activity: Yes     Partners: Female   Other Topics Concern    Not on file   Social History Narrative    Not on file     Social Drivers of Health     Financial Resource Strain: Medium Risk (12/10/2024)    Overall Financial Resource Strain (CARDIA)     Difficulty of Paying Living Expenses: Somewhat hard   Food Insecurity: No Food Insecurity (7/15/2025)    Hunger Vital Sign     Worried About Running Out of Food in the Last Year: Never true     Ran Out of Food in the Last Year: Never true   Transportation Needs: No Transportation Needs (7/15/2025)    PRAPARE - Transportation     Lack of Transportation (Medical): No

## 2025-08-11 PROBLEM — K64.4 EXTERNAL HEMORRHOIDS: Status: ACTIVE | Noted: 2025-08-11

## 2025-08-12 ENCOUNTER — TELEPHONE (OUTPATIENT)
Dept: BARIATRICS/WEIGHT MGMT | Age: 40
End: 2025-08-12